# Patient Record
Sex: FEMALE | Race: WHITE | Employment: UNEMPLOYED | URBAN - NONMETROPOLITAN AREA
[De-identification: names, ages, dates, MRNs, and addresses within clinical notes are randomized per-mention and may not be internally consistent; named-entity substitution may affect disease eponyms.]

---

## 2018-01-08 ENCOUNTER — HOSPITAL ENCOUNTER (EMERGENCY)
Age: 38
Discharge: LEFT BEFORE DISCHARGE | End: 2018-01-09
Attending: FAMILY MEDICINE | Admitting: INTERNAL MEDICINE
Payer: MEDICARE

## 2018-01-08 ENCOUNTER — APPOINTMENT (OUTPATIENT)
Dept: INTERVENTIONAL RADIOLOGY/VASCULAR | Age: 38
End: 2018-01-08
Payer: MEDICARE

## 2018-01-08 ENCOUNTER — APPOINTMENT (OUTPATIENT)
Dept: GENERAL RADIOLOGY | Age: 38
End: 2018-01-08
Payer: MEDICARE

## 2018-01-08 ENCOUNTER — APPOINTMENT (OUTPATIENT)
Dept: CT IMAGING | Age: 38
End: 2018-01-08
Payer: MEDICARE

## 2018-01-08 VITALS
TEMPERATURE: 99.7 F | DIASTOLIC BLOOD PRESSURE: 84 MMHG | HEART RATE: 84 BPM | RESPIRATION RATE: 18 BRPM | SYSTOLIC BLOOD PRESSURE: 124 MMHG | OXYGEN SATURATION: 96 %

## 2018-01-08 DIAGNOSIS — J44.1 COPD WITH ACUTE EXACERBATION (HCC): Primary | ICD-10-CM

## 2018-01-08 DIAGNOSIS — R06.89 CO2 NARCOSIS: ICD-10-CM

## 2018-01-08 LAB
ALBUMIN SERPL-MCNC: 3.9 G/DL (ref 3.5–5.1)
ALLEN TEST: ABNORMAL
ALP BLD-CCNC: 106 U/L (ref 38–126)
ALT SERPL-CCNC: 22 U/L (ref 11–66)
AMPHETAMINE+METHAMPHETAMINE URINE SCREEN: NEGATIVE
ANION GAP SERPL CALCULATED.3IONS-SCNC: 9 MEQ/L (ref 8–16)
AST SERPL-CCNC: 20 U/L (ref 5–40)
BACTERIA: ABNORMAL /HPF
BARBITURATE QUANTITATIVE URINE: NEGATIVE
BASE EXCESS (CALCULATED): 5.1 MMOL/L (ref -2.5–2.5)
BASOPHILS # BLD: 0.2 %
BASOPHILS ABSOLUTE: 0 THOU/MM3 (ref 0–0.1)
BENZODIAZEPINE QUANTITATIVE URINE: NEGATIVE
BETA-HYDROXYBUTYRATE: 0.96 MG/DL (ref 0.2–2.81)
BILIRUB SERPL-MCNC: 0.3 MG/DL (ref 0.3–1.2)
BILIRUBIN DIRECT: < 0.2 MG/DL (ref 0–0.3)
BILIRUBIN URINE: NEGATIVE
BLOOD, URINE: ABNORMAL
BUN BLDV-MCNC: 8 MG/DL (ref 7–22)
CALCIUM SERPL-MCNC: 9.5 MG/DL (ref 8.5–10.5)
CANNABINOID QUANTITATIVE URINE: NEGATIVE
CASTS 2: ABNORMAL /LPF
CASTS UA: ABNORMAL /LPF
CHARACTER, URINE: ABNORMAL
CHLORIDE BLD-SCNC: 95 MEQ/L (ref 98–111)
CO2: 33 MEQ/L (ref 23–33)
COCAINE METABOLITE QUANTITATIVE URINE: NEGATIVE
COLLECTED BY:: ABNORMAL
COLOR: YELLOW
CREAT SERPL-MCNC: 0.5 MG/DL (ref 0.4–1.2)
CRYSTALS, UA: ABNORMAL
DEVICE: ABNORMAL
EKG ATRIAL RATE: 96 BPM
EKG P AXIS: 40 DEGREES
EKG P-R INTERVAL: 148 MS
EKG Q-T INTERVAL: 348 MS
EKG QRS DURATION: 100 MS
EKG QTC CALCULATION (BAZETT): 439 MS
EKG R AXIS: -13 DEGREES
EKG T AXIS: 16 DEGREES
EKG VENTRICULAR RATE: 96 BPM
EOSINOPHIL # BLD: 4 %
EOSINOPHILS ABSOLUTE: 0.4 THOU/MM3 (ref 0–0.4)
EPITHELIAL CELLS, UA: ABNORMAL /HPF
ETHYL ALCOHOL, SERUM: < 0.01 %
FLU A ANTIGEN: NEGATIVE
FLU B ANTIGEN: NEGATIVE
GFR SERPL CREATININE-BSD FRML MDRD: > 90 ML/MIN/1.73M2
GLUCOSE BLD-MCNC: 346 MG/DL (ref 70–108)
GLUCOSE URINE: >= 1000 MG/DL
HCO3: 32 MMOL/L (ref 23–28)
HCT VFR BLD CALC: 42.6 % (ref 37–47)
HEMOGLOBIN: 14.4 GM/DL (ref 12–16)
KETONES, URINE: NEGATIVE
LACTIC ACID: 1.8 MMOL/L (ref 0.5–2.2)
LEUKOCYTE ESTERASE, URINE: NEGATIVE
LIPASE: 11.1 U/L (ref 5.6–51.3)
LYMPHOCYTES # BLD: 34 %
LYMPHOCYTES ABSOLUTE: 3.1 THOU/MM3 (ref 1–4.8)
MAGNESIUM: 1.5 MG/DL (ref 1.6–2.4)
MCH RBC QN AUTO: 29.7 PG (ref 27–31)
MCHC RBC AUTO-ENTMCNC: 33.9 GM/DL (ref 33–37)
MCV RBC AUTO: 87.5 FL (ref 81–99)
MISCELLANEOUS 2: ABNORMAL
MONOCYTES # BLD: 5.7 %
MONOCYTES ABSOLUTE: 0.5 THOU/MM3 (ref 0.4–1.3)
NITRITE, URINE: NEGATIVE
NUCLEATED RED BLOOD CELLS: 0 /100 WBC
O2 SATURATION: 88 %
OPIATES, URINE: NEGATIVE
OSMOLALITY CALCULATION: 285.9 MOSMOL/KG (ref 275–300)
OXYCODONE: NEGATIVE
PCO2: 53 MMHG (ref 35–45)
PDW BLD-RTO: 12.9 % (ref 11.5–14.5)
PH BLOOD GAS: 7.39 (ref 7.35–7.45)
PH UA: 5.5
PHENCYCLIDINE QUANTITATIVE URINE: NEGATIVE
PLATELET # BLD: 244 THOU/MM3 (ref 130–400)
PMV BLD AUTO: 8.2 MCM (ref 7.4–10.4)
PO2: 56 MMHG (ref 71–104)
POTASSIUM SERPL-SCNC: 4.2 MEQ/L (ref 3.5–5.2)
PREGNANCY, SERUM: NEGATIVE
PROTEIN UA: NEGATIVE
RBC # BLD: 4.86 MILL/MM3 (ref 4.2–5.4)
RBC URINE: ABNORMAL /HPF
RENAL EPITHELIAL, UA: ABNORMAL
SEG NEUTROPHILS: 56.1 %
SEGMENTED NEUTROPHILS ABSOLUTE COUNT: 5.2 THOU/MM3 (ref 1.8–7.7)
SODIUM BLD-SCNC: 137 MEQ/L (ref 135–145)
SOURCE, BLOOD GAS: ABNORMAL
SPECIFIC GRAVITY, URINE: > 1.03 (ref 1–1.03)
TOTAL PROTEIN: 7.5 G/DL (ref 6.1–8)
TROPONIN T: < 0.01 NG/ML
UROBILINOGEN, URINE: 0.2 EU/DL
WBC # BLD: 9.2 THOU/MM3 (ref 4.8–10.8)
WBC UA: ABNORMAL /HPF
YEAST: ABNORMAL

## 2018-01-08 PROCEDURE — 84703 CHORIONIC GONADOTROPIN ASSAY: CPT

## 2018-01-08 PROCEDURE — 83880 ASSAY OF NATRIURETIC PEPTIDE: CPT

## 2018-01-08 PROCEDURE — 80053 COMPREHEN METABOLIC PANEL: CPT

## 2018-01-08 PROCEDURE — 6370000000 HC RX 637 (ALT 250 FOR IP): Performed by: FAMILY MEDICINE

## 2018-01-08 PROCEDURE — 83690 ASSAY OF LIPASE: CPT

## 2018-01-08 PROCEDURE — 2580000003 HC RX 258: Performed by: FAMILY MEDICINE

## 2018-01-08 PROCEDURE — 36600 WITHDRAWAL OF ARTERIAL BLOOD: CPT

## 2018-01-08 PROCEDURE — 36415 COLL VENOUS BLD VENIPUNCTURE: CPT

## 2018-01-08 PROCEDURE — 80307 DRUG TEST PRSMV CHEM ANLYZR: CPT

## 2018-01-08 PROCEDURE — 94640 AIRWAY INHALATION TREATMENT: CPT

## 2018-01-08 PROCEDURE — 83605 ASSAY OF LACTIC ACID: CPT

## 2018-01-08 PROCEDURE — 82010 KETONE BODYS QUAN: CPT

## 2018-01-08 PROCEDURE — 81001 URINALYSIS AUTO W/SCOPE: CPT

## 2018-01-08 PROCEDURE — 99285 EMERGENCY DEPT VISIT HI MDM: CPT

## 2018-01-08 PROCEDURE — 6360000004 HC RX CONTRAST MEDICATION: Performed by: FAMILY MEDICINE

## 2018-01-08 PROCEDURE — 71046 X-RAY EXAM CHEST 2 VIEWS: CPT

## 2018-01-08 PROCEDURE — 96375 TX/PRO/DX INJ NEW DRUG ADDON: CPT

## 2018-01-08 PROCEDURE — 87086 URINE CULTURE/COLONY COUNT: CPT

## 2018-01-08 PROCEDURE — 82248 BILIRUBIN DIRECT: CPT

## 2018-01-08 PROCEDURE — 83735 ASSAY OF MAGNESIUM: CPT

## 2018-01-08 PROCEDURE — 71275 CT ANGIOGRAPHY CHEST: CPT

## 2018-01-08 PROCEDURE — 84484 ASSAY OF TROPONIN QUANT: CPT

## 2018-01-08 PROCEDURE — 93005 ELECTROCARDIOGRAM TRACING: CPT

## 2018-01-08 PROCEDURE — G0480 DRUG TEST DEF 1-7 CLASSES: HCPCS

## 2018-01-08 PROCEDURE — 82803 BLOOD GASES ANY COMBINATION: CPT

## 2018-01-08 PROCEDURE — 93971 EXTREMITY STUDY: CPT

## 2018-01-08 PROCEDURE — 85025 COMPLETE CBC W/AUTO DIFF WBC: CPT

## 2018-01-08 PROCEDURE — 87804 INFLUENZA ASSAY W/OPTIC: CPT

## 2018-01-08 RX ORDER — IPRATROPIUM BROMIDE AND ALBUTEROL SULFATE 2.5; .5 MG/3ML; MG/3ML
1 SOLUTION RESPIRATORY (INHALATION) ONCE
Status: COMPLETED | OUTPATIENT
Start: 2018-01-08 | End: 2018-01-08

## 2018-01-08 RX ORDER — SODIUM CHLORIDE 9 MG/ML
INJECTION, SOLUTION INTRAVENOUS CONTINUOUS
Status: DISCONTINUED | OUTPATIENT
Start: 2018-01-08 | End: 2018-01-09 | Stop reason: HOSPADM

## 2018-01-08 RX ORDER — MAGNESIUM SULFATE IN WATER 40 MG/ML
2 INJECTION, SOLUTION INTRAVENOUS ONCE
Status: COMPLETED | OUTPATIENT
Start: 2018-01-08 | End: 2018-01-09

## 2018-01-08 RX ADMIN — IPRATROPIUM BROMIDE AND ALBUTEROL SULFATE 1 AMPULE: .5; 3 SOLUTION RESPIRATORY (INHALATION) at 22:06

## 2018-01-08 RX ADMIN — IOPAMIDOL 80 ML: 755 INJECTION, SOLUTION INTRAVENOUS at 23:21

## 2018-01-08 RX ADMIN — INSULIN HUMAN 10 UNITS: 100 INJECTION, SOLUTION PARENTERAL at 22:35

## 2018-01-08 RX ADMIN — SODIUM CHLORIDE: 9 INJECTION, SOLUTION INTRAVENOUS at 22:30

## 2018-01-08 RX ADMIN — IPRATROPIUM BROMIDE AND ALBUTEROL SULFATE 1 AMPULE: .5; 3 SOLUTION RESPIRATORY (INHALATION) at 22:01

## 2018-01-08 ASSESSMENT — ENCOUNTER SYMPTOMS
COUGH: 0
ABDOMINAL PAIN: 0
DIARRHEA: 0
NAUSEA: 0
APNEA: 0
TROUBLE SWALLOWING: 0
STRIDOR: 0
BACK PAIN: 0
VOMITING: 0
CHEST TIGHTNESS: 0
WHEEZING: 0
SORE THROAT: 0
SHORTNESS OF BREATH: 1
COLOR CHANGE: 0

## 2018-01-08 ASSESSMENT — PAIN DESCRIPTION - LOCATION: LOCATION: LEG

## 2018-01-08 ASSESSMENT — PAIN SCALES - GENERAL: PAINLEVEL_OUTOF10: 6

## 2018-01-08 ASSESSMENT — PAIN DESCRIPTION - PAIN TYPE: TYPE: ACUTE PAIN

## 2018-01-08 ASSESSMENT — PAIN DESCRIPTION - ORIENTATION: ORIENTATION: LEFT

## 2018-01-08 ASSESSMENT — PAIN DESCRIPTION - FREQUENCY: FREQUENCY: CONTINUOUS

## 2018-01-09 ENCOUNTER — HOSPITAL ENCOUNTER (EMERGENCY)
Age: 38
Discharge: LEFT BEFORE DISCHARGE | End: 2018-01-09
Payer: MEDICARE

## 2018-01-09 VITALS
WEIGHT: 260 LBS | SYSTOLIC BLOOD PRESSURE: 126 MMHG | DIASTOLIC BLOOD PRESSURE: 67 MMHG | RESPIRATION RATE: 22 BRPM | TEMPERATURE: 98.5 F | OXYGEN SATURATION: 94 % | HEART RATE: 99 BPM | HEIGHT: 66 IN | BODY MASS INDEX: 41.78 KG/M2

## 2018-01-09 DIAGNOSIS — E11.9 TYPE 2 DIABETES MELLITUS WITHOUT COMPLICATION, WITHOUT LONG-TERM CURRENT USE OF INSULIN (HCC): ICD-10-CM

## 2018-01-09 DIAGNOSIS — R09.02 HYPOXIA: ICD-10-CM

## 2018-01-09 DIAGNOSIS — J44.1 COPD EXACERBATION (HCC): Primary | ICD-10-CM

## 2018-01-09 LAB
ALBUMIN SERPL-MCNC: 3.7 G/DL (ref 3.5–5.1)
ALLEN TEST: POSITIVE
ALP BLD-CCNC: 108 U/L (ref 38–126)
ALT SERPL-CCNC: 21 U/L (ref 11–66)
ANION GAP SERPL CALCULATED.3IONS-SCNC: 10 MEQ/L (ref 8–16)
AST SERPL-CCNC: 20 U/L (ref 5–40)
BASE EXCESS (CALCULATED): 6.7 MMOL/L (ref -2.5–2.5)
BASOPHILS # BLD: 0.3 %
BASOPHILS ABSOLUTE: 0 THOU/MM3 (ref 0–0.1)
BILIRUB SERPL-MCNC: < 0.2 MG/DL (ref 0.3–1.2)
BUN BLDV-MCNC: 11 MG/DL (ref 7–22)
CALCIUM SERPL-MCNC: 9.4 MG/DL (ref 8.5–10.5)
CHLORIDE BLD-SCNC: 94 MEQ/L (ref 98–111)
CO2: 32 MEQ/L (ref 23–33)
COLLECTED BY:: ABNORMAL
CREAT SERPL-MCNC: 0.5 MG/DL (ref 0.4–1.2)
DEVICE: ABNORMAL
EKG ATRIAL RATE: 103 BPM
EKG P AXIS: 65 DEGREES
EKG P-R INTERVAL: 158 MS
EKG Q-T INTERVAL: 330 MS
EKG QRS DURATION: 94 MS
EKG QTC CALCULATION (BAZETT): 432 MS
EKG R AXIS: 7 DEGREES
EKG T AXIS: 28 DEGREES
EKG VENTRICULAR RATE: 103 BPM
EOSINOPHIL # BLD: 4 %
EOSINOPHILS ABSOLUTE: 0.3 THOU/MM3 (ref 0–0.4)
GFR SERPL CREATININE-BSD FRML MDRD: > 90 ML/MIN/1.73M2
GLUCOSE BLD-MCNC: 249 MG/DL (ref 70–108)
GLUCOSE BLD-MCNC: 424 MG/DL (ref 70–108)
HCO3: 35 MMOL/L (ref 23–28)
HCT VFR BLD CALC: 42.2 % (ref 37–47)
HEMOGLOBIN: 14.2 GM/DL (ref 12–16)
IFIO2: 1
LACTIC ACID: 2 MMOL/L (ref 0.5–2.2)
LYMPHOCYTES # BLD: 34.1 %
LYMPHOCYTES ABSOLUTE: 2.8 THOU/MM3 (ref 1–4.8)
MCH RBC QN AUTO: 29.6 PG (ref 27–31)
MCHC RBC AUTO-ENTMCNC: 33.7 GM/DL (ref 33–37)
MCV RBC AUTO: 87.6 FL (ref 81–99)
MONOCYTES # BLD: 6.2 %
MONOCYTES ABSOLUTE: 0.5 THOU/MM3 (ref 0.4–1.3)
NUCLEATED RED BLOOD CELLS: 0 /100 WBC
O2 SATURATION: 88 %
OSMOLALITY CALCULATION: 289.4 MOSMOL/KG (ref 275–300)
PCO2: 63 MMHG (ref 35–45)
PDW BLD-RTO: 12.9 % (ref 11.5–14.5)
PH BLOOD GAS: 7.35 (ref 7.35–7.45)
PLATELET # BLD: 213 THOU/MM3 (ref 130–400)
PMV BLD AUTO: 8.4 MCM (ref 7.4–10.4)
PO2: 59 MMHG (ref 71–104)
POTASSIUM SERPL-SCNC: 4.6 MEQ/L (ref 3.5–5.2)
PRO-BNP: 10.6 PG/ML (ref 0–450)
PRO-BNP: 6.5 PG/ML (ref 0–450)
RBC # BLD: 4.81 MILL/MM3 (ref 4.2–5.4)
SEG NEUTROPHILS: 55.4 %
SEGMENTED NEUTROPHILS ABSOLUTE COUNT: 4.5 THOU/MM3 (ref 1.8–7.7)
SODIUM BLD-SCNC: 136 MEQ/L (ref 135–145)
SOURCE, BLOOD GAS: ABNORMAL
TOTAL PROTEIN: 6.8 G/DL (ref 6.1–8)
WBC # BLD: 8.1 THOU/MM3 (ref 4.8–10.8)

## 2018-01-09 PROCEDURE — 82803 BLOOD GASES ANY COMBINATION: CPT

## 2018-01-09 PROCEDURE — 36415 COLL VENOUS BLD VENIPUNCTURE: CPT

## 2018-01-09 PROCEDURE — 83880 ASSAY OF NATRIURETIC PEPTIDE: CPT

## 2018-01-09 PROCEDURE — 6370000000 HC RX 637 (ALT 250 FOR IP): Performed by: NURSE PRACTITIONER

## 2018-01-09 PROCEDURE — 96365 THER/PROPH/DIAG IV INF INIT: CPT

## 2018-01-09 PROCEDURE — 80053 COMPREHEN METABOLIC PANEL: CPT

## 2018-01-09 PROCEDURE — 83605 ASSAY OF LACTIC ACID: CPT

## 2018-01-09 PROCEDURE — 85025 COMPLETE CBC W/AUTO DIFF WBC: CPT

## 2018-01-09 PROCEDURE — 82948 REAGENT STRIP/BLOOD GLUCOSE: CPT

## 2018-01-09 PROCEDURE — 94640 AIRWAY INHALATION TREATMENT: CPT

## 2018-01-09 PROCEDURE — 99285 EMERGENCY DEPT VISIT HI MDM: CPT

## 2018-01-09 PROCEDURE — 6360000002 HC RX W HCPCS: Performed by: FAMILY MEDICINE

## 2018-01-09 PROCEDURE — 93005 ELECTROCARDIOGRAM TRACING: CPT

## 2018-01-09 PROCEDURE — 36600 WITHDRAWAL OF ARTERIAL BLOOD: CPT

## 2018-01-09 RX ORDER — BUPRENORPHINE AND NALOXONE 4; 1 MG/1; MG/1
1 FILM, SOLUBLE BUCCAL; SUBLINGUAL 2 TIMES DAILY
Status: CANCELLED | OUTPATIENT
Start: 2018-01-09

## 2018-01-09 RX ORDER — TRAZODONE HYDROCHLORIDE 50 MG/1
50 TABLET ORAL NIGHTLY
Status: CANCELLED | OUTPATIENT
Start: 2018-01-09

## 2018-01-09 RX ORDER — QUETIAPINE FUMARATE 100 MG/1
100 TABLET, FILM COATED ORAL NIGHTLY
Status: CANCELLED | OUTPATIENT
Start: 2018-01-09

## 2018-01-09 RX ORDER — FUROSEMIDE 40 MG/1
40 TABLET ORAL DAILY
Status: CANCELLED | OUTPATIENT
Start: 2018-01-09

## 2018-01-09 RX ORDER — NAPROXEN 250 MG/1
500 TABLET ORAL 2 TIMES DAILY
Status: CANCELLED | OUTPATIENT
Start: 2018-01-09

## 2018-01-09 RX ORDER — PROMETHAZINE HYDROCHLORIDE 25 MG/1
25 TABLET ORAL EVERY 6 HOURS PRN
Status: CANCELLED | OUTPATIENT
Start: 2018-01-09

## 2018-01-09 RX ORDER — PREDNISONE 20 MG/1
40 TABLET ORAL ONCE
Status: COMPLETED | OUTPATIENT
Start: 2018-01-09 | End: 2018-01-09

## 2018-01-09 RX ORDER — IPRATROPIUM BROMIDE AND ALBUTEROL SULFATE 2.5; .5 MG/3ML; MG/3ML
1 SOLUTION RESPIRATORY (INHALATION) ONCE
Status: COMPLETED | OUTPATIENT
Start: 2018-01-09 | End: 2018-01-09

## 2018-01-09 RX ORDER — AZITHROMYCIN 250 MG/1
TABLET, FILM COATED ORAL
Qty: 1 PACKET | Refills: 0 | Status: SHIPPED | OUTPATIENT
Start: 2018-01-09 | End: 2018-01-19

## 2018-01-09 RX ORDER — ALBUTEROL SULFATE 90 UG/1
2 AEROSOL, METERED RESPIRATORY (INHALATION) EVERY 6 HOURS PRN
Qty: 1 INHALER | Refills: 3 | Status: SHIPPED | OUTPATIENT
Start: 2018-01-09

## 2018-01-09 RX ORDER — PREDNISONE 10 MG/1
TABLET ORAL
Qty: 20 TABLET | Refills: 0 | Status: SHIPPED | OUTPATIENT
Start: 2018-01-09 | End: 2018-01-19

## 2018-01-09 RX ADMIN — PREDNISONE 40 MG: 20 TABLET ORAL at 18:18

## 2018-01-09 RX ADMIN — IPRATROPIUM BROMIDE AND ALBUTEROL SULFATE 1 AMPULE: .5; 3 SOLUTION RESPIRATORY (INHALATION) at 17:56

## 2018-01-09 RX ADMIN — MAGNESIUM SULFATE HEPTAHYDRATE 2 G: 40 INJECTION, SOLUTION INTRAVENOUS at 00:32

## 2018-01-09 ASSESSMENT — ENCOUNTER SYMPTOMS
CONSTIPATION: 0
PHOTOPHOBIA: 0
EYE REDNESS: 0
SINUS PRESSURE: 0
COUGH: 0
CHEST TIGHTNESS: 0
SORE THROAT: 0
VOMITING: 0
DIARRHEA: 0
BLOOD IN STOOL: 0
SHORTNESS OF BREATH: 1
BACK PAIN: 0
ABDOMINAL PAIN: 0
ABDOMINAL DISTENTION: 0
VOICE CHANGE: 0
WHEEZING: 0
NAUSEA: 0
RHINORRHEA: 0
COLOR CHANGE: 0

## 2018-01-09 NOTE — ED PROVIDER NOTES
[facility]  eMERGENCY dEPARTMENT eNCOUnter          [address] [facility]       Chief Complaint   Patient presents with    Cough    Leg Swelling       Nurses Notes reviewed and I agree except as noted in the HPI. HISTORY OF PRESENT ILLNESS    Trice Ku is a 30 y.o. female who presents to the Emergency Department for the evaluation of Leg swelling and shortness of breath. Patient has noticed that for the past 3 weeks her left leg more so on the right leg. She also notices erythema and some pain. She is not having difficulty ambulating. Since the past 3 days she is having some shortness of breath beyond her baseline. She states she is having productive cough with yellowish sputum. She denies any fevers or chills. She denies any nausea or vomiting. She denies any headache but complains of occasional dizziness since shortness of breath started. She has a history of blood clots in her family. She states that she also had a blood clot during her pregnancy but she doesn't recall where it was. The HPI was provided by the patient. REVIEW OF SYSTEMS     Review of Systems   Constitutional: Negative for chills, diaphoresis, fatigue, fever and unexpected weight change. HENT: Negative for sore throat and trouble swallowing. Respiratory: Positive for shortness of breath. Negative for apnea, cough, chest tightness, wheezing and stridor. Cardiovascular: Positive for leg swelling. Negative for chest pain and palpitations. Gastrointestinal: Negative for abdominal pain, diarrhea, nausea and vomiting. Musculoskeletal: Negative for back pain and neck pain. Skin: Negative for color change. Allergic/Immunologic: Negative for immunocompromised state. Neurological: Negative for dizziness, syncope, speech difficulty, light-headedness, numbness and headaches. Hematological: Does not bruise/bleed easily. Psychiatric/Behavioral: Negative for confusion.    All other systems reviewed and are negative. PAST MEDICAL HISTORY    has a past medical history of Drug addiction ([address] and HPV (human papilloma virus) infection. SURGICAL HISTORY      has a past surgical history that includes Foot surgery (); Dilation and curettage of uterus (); Cervix biopsy (); Axillary Surgery (left , rt );  section (); and other surgical history (2012). CURRENT MEDICATIONS       Previous Medications    BUPRENORPHINE HCL-NALOXONE HCL (SUBOXONE) 4-1 MG FILM    Place 4 mg under the tongue 2 times daily. FUROSEMIDE (LASIX) 40 MG TABLET    Take 1 tablet by mouth daily. IBUPROFEN (ADVIL;MOTRIN) 800 MG TABLET    Take 800 mg by mouth every 6 hours as needed. LIDOCAINE VISCOUS (XYLOCAINE) 2 % SOLUTION    Take 10 mLs by mouth as needed for Pain (Saturate cotton ball in place on tooth and gum every 4-6 hours when necessary)    NAPROXEN (NAPROSYN) 500 MG TABLET    Take 1 tablet by mouth 2 times daily. NYSTATIN (MYCOSTATIN) POWDER    Apply topically 4 times daily. PROMETHAZINE (PHENERGAN) 25 MG TABLET    Take 25 mg by mouth every 6 hours as needed for Nausea. QUETIAPINE (SEROQUEL) 25 MG TABLET    Take 100 mg by mouth nightly. TRAZODONE (DESYREL) 50 MG TABLET    Take 50 mg by mouth nightly. ALLERGIES     is allergic to ketorolac tromethamine; morphine; and darvocet [propoxyphene n-acetaminophen]. FAMILY HISTORY     indicated that her mother is alive. She indicated that her father is . She indicated that the status of her maternal grandfather is unknown. She indicated that the status of her maternal aunt is unknown.    family history includes Arthritis in her mother; COPD in her father and mother; Cancer in her maternal grandfather; Diabetes in her maternal aunt; Heart Disease in her father; High Blood Pressure in her father and mother; Stroke in her father. SOCIAL HISTORY      reports that she has been smoking Cigarettes.   She has a 17.00

## 2018-01-09 NOTE — ED TRIAGE NOTES
Pt to er. Pt c/o SOB. States was seen here yesterday and dx PE. States left AMA. States SOB is worse today. Denies all pain. Resp regular. Pt placed on 2L NC due to low SPO2. Increased to 94%.

## 2018-01-09 NOTE — ED TRIAGE NOTES
Presents to ED with lower leg pain states that she is worried she has a blood clot resp easy and unlabored pms intact in left foot

## 2018-01-09 NOTE — ED PROVIDER NOTES
[facility] Emergency Department    CHIEF COMPLAINT       Chief Complaint   Patient presents with    Shortness of Breath       Nurses Notes reviewed and I agree except as noted in the HPI. HISTORY OF PRESENT ILLNESS    Trice Xiong is a 30 y.o. female who presents to the ED for evaluation of shortness of breath and leg swelling. The patient has a three week history of gradually worsening shortness of breath and leg swelling. The leg swelling is bilateral, however worse on the left compared to the right. She was evaluated in the ED last night for her symptoms, her chest CTA was inconclusive for a PE, however due to hypoxia present on her lab results, admission was offered to further workup and evaluation. The patient had left AMA last night, returns today as her symptoms have continued to worsen. She denies any recent travel and does not take any hormone therapy. The patient is a smoker. Family is concerned due to the patient having increased fatigue and falling asleep during daily activities. Symptoms description:  Onset: 3 weeks  Symptoms: SOB  Duration: SOB  Aggravating factors: exertion   Timing: worsening with time   Severity: moderate     HPI was provided by the patient. REVIEW OF SYSTEMS     Review of Systems   Constitutional: Negative for appetite change, chills, diaphoresis, fatigue, fever and unexpected weight change. HENT: Negative for congestion, hearing loss, postnasal drip, rhinorrhea, sinus pressure, sore throat and voice change. Eyes: Negative for photophobia, redness and visual disturbance. Respiratory: Positive for shortness of breath. Negative for cough, chest tightness and wheezing. Cardiovascular: Positive for leg swelling. Negative for chest pain and palpitations. Gastrointestinal: Negative for abdominal distention, abdominal pain, blood in stool, constipation, diarrhea, nausea and vomiting.    Endocrine: Negative for cold intolerance, heat intolerance, polydipsia, Neurological: She is alert and oriented to person, place, and time. Skin: Skin is warm, dry and intact. Psychiatric: She has a normal mood and affect. Her speech is normal and behavior is normal. Thought content normal.   Nursing note and vitals reviewed. DIFFERENTIAL DIAGNOSIS:   PE, Hypoxia, COPD exacerbation     DIAGNOSTIC RESULTS     EKG: All EKG's are interpreted by the Emergency Department Physician who either signs or Co-signs this chart in the absence of a cardiologist.    EKG read and interpreted by myself gives impression of sinus tachycardia with heart rate of 103; interval 158; QRS 94;QTc 432; axis 65/7/28. No STEMI      RADIOLOGY: non-plain film images(s) such as CT, Ultrasound and MRI are read by the radiologist.  Plain radiographic images are visualized and preliminarily interpreted by the emergency physician unless otherwise stated below.   No orders to display         LABS:   Labs Reviewed   COMPREHENSIVE METABOLIC PANEL - Abnormal; Notable for the following:        Result Value    Glucose 424 (*)     Chloride 94 (*)     Total Bilirubin <0.2 (*)     All other components within normal limits   BLOOD GAS, ARTERIAL - Abnormal; Notable for the following:     PCO2 63 (*)     PO2 59 (*)     HCO3 35 (*)     Base Excess (Calculated) 6.7 (*)     All other components within normal limits   BRAIN NATRIURETIC PEPTIDE   CBC WITH AUTO DIFFERENTIAL   LACTIC ACID, PLASMA   ANION GAP   GLOMERULAR FILTRATION RATE, ESTIMATED   OSMOLALITY   URINE DRUG SCREEN       EMERGENCY DEPARTMENT COURSE:   Vitals:    Vitals:    01/09/18 1704 01/09/18 1813 01/09/18 1955   BP: (!) 142/94 98/61 126/67   Pulse: 106 93 99   Resp: 18 20 22   Temp: 98.5 °F (36.9 °C)     TempSrc: Oral Oral    SpO2: 91% 96% 94%   Weight: 260 lb (117.9 kg)     Height: 5' 6\" (1.676 m)         MDM    Medications   predniSONE (DELTASONE) tablet 40 mg (40 mg Oral Given 1/9/18 1818)   ipratropium-albuterol (DUONEB) nebulizer solution 1 ampule (1 ampule

## 2018-01-10 LAB
ORGANISM: ABNORMAL
URINE CULTURE REFLEX: ABNORMAL

## 2018-01-13 ENCOUNTER — HOSPITAL ENCOUNTER (INPATIENT)
Age: 38
LOS: 1 days | Discharge: LEFT BEFORE DISCHARGE | DRG: 191 | End: 2018-01-15
Attending: FAMILY MEDICINE | Admitting: FAMILY MEDICINE
Payer: MEDICARE

## 2018-01-13 ENCOUNTER — APPOINTMENT (OUTPATIENT)
Dept: CT IMAGING | Age: 38
DRG: 191 | End: 2018-01-13
Payer: MEDICARE

## 2018-01-13 ENCOUNTER — APPOINTMENT (OUTPATIENT)
Dept: INTERVENTIONAL RADIOLOGY/VASCULAR | Age: 38
DRG: 191 | End: 2018-01-13
Payer: MEDICARE

## 2018-01-13 DIAGNOSIS — J44.1 COPD EXACERBATION (HCC): Primary | ICD-10-CM

## 2018-01-13 DIAGNOSIS — R09.02 HYPOXIA: ICD-10-CM

## 2018-01-13 DIAGNOSIS — R07.9 CHEST PAIN, UNSPECIFIED TYPE: ICD-10-CM

## 2018-01-13 LAB
ALLEN TEST: POSITIVE
ANION GAP SERPL CALCULATED.3IONS-SCNC: 12 MEQ/L (ref 8–16)
BASE EXCESS (CALCULATED): 2.6 MMOL/L (ref -2.5–2.5)
BASOPHILS # BLD: 0.5 %
BASOPHILS ABSOLUTE: 0 THOU/MM3 (ref 0–0.1)
BUN BLDV-MCNC: 7 MG/DL (ref 7–22)
CALCIUM SERPL-MCNC: 9.1 MG/DL (ref 8.5–10.5)
CHLORIDE BLD-SCNC: 95 MEQ/L (ref 98–111)
CO2: 29 MEQ/L (ref 23–33)
COLLECTED BY:: ABNORMAL
CREAT SERPL-MCNC: 0.6 MG/DL (ref 0.4–1.2)
DEVICE: ABNORMAL
EKG ATRIAL RATE: 102 BPM
EKG P AXIS: 56 DEGREES
EKG P-R INTERVAL: 164 MS
EKG Q-T INTERVAL: 338 MS
EKG QRS DURATION: 94 MS
EKG QTC CALCULATION (BAZETT): 440 MS
EKG R AXIS: 53 DEGREES
EKG T AXIS: 23 DEGREES
EKG VENTRICULAR RATE: 102 BPM
EOSINOPHIL # BLD: 3.6 %
EOSINOPHILS ABSOLUTE: 0.3 THOU/MM3 (ref 0–0.4)
FLU A ANTIGEN: NEGATIVE
FLU B ANTIGEN: NEGATIVE
GFR SERPL CREATININE-BSD FRML MDRD: > 90 ML/MIN/1.73M2
GLUCOSE BLD-MCNC: 404 MG/DL (ref 70–108)
HCO3: 29 MMOL/L (ref 23–28)
HCT VFR BLD CALC: 45.1 % (ref 37–47)
HEMOGLOBIN: 15.5 GM/DL (ref 12–16)
LACTIC ACID: 3 MMOL/L (ref 0.5–2.2)
LYMPHOCYTES # BLD: 29.8 %
LYMPHOCYTES ABSOLUTE: 2.9 THOU/MM3 (ref 1–4.8)
MAGNESIUM: 1.7 MG/DL (ref 1.6–2.4)
MCH RBC QN AUTO: 30.4 PG (ref 27–31)
MCHC RBC AUTO-ENTMCNC: 34.4 GM/DL (ref 33–37)
MCV RBC AUTO: 88.5 FL (ref 81–99)
MONOCYTES # BLD: 4.9 %
MONOCYTES ABSOLUTE: 0.5 THOU/MM3 (ref 0.4–1.3)
NUCLEATED RED BLOOD CELLS: 0 /100 WBC
O2 SATURATION: 93 %
OSMOLALITY CALCULATION: 286.9 MOSMOL/KG (ref 275–300)
PCO2: 52 MMHG (ref 35–45)
PDW BLD-RTO: 12.9 % (ref 11.5–14.5)
PH BLOOD GAS: 7.36 (ref 7.35–7.45)
PLATELET # BLD: 230 THOU/MM3 (ref 130–400)
PMV BLD AUTO: 8.5 MCM (ref 7.4–10.4)
PO2: 70 MMHG (ref 71–104)
POTASSIUM SERPL-SCNC: 4.1 MEQ/L (ref 3.5–5.2)
PRO-BNP: 9.6 PG/ML (ref 0–450)
PROCALCITONIN: 0.03 NG/ML (ref 0.01–0.09)
RBC # BLD: 5.09 MILL/MM3 (ref 4.2–5.4)
SEG NEUTROPHILS: 61.2 %
SEGMENTED NEUTROPHILS ABSOLUTE COUNT: 5.9 THOU/MM3 (ref 1.8–7.7)
SODIUM BLD-SCNC: 136 MEQ/L (ref 135–145)
SOURCE, BLOOD GAS: ABNORMAL
TROPONIN T: < 0.01 NG/ML
WBC # BLD: 9.7 THOU/MM3 (ref 4.8–10.8)

## 2018-01-13 PROCEDURE — 83880 ASSAY OF NATRIURETIC PEPTIDE: CPT

## 2018-01-13 PROCEDURE — 87147 CULTURE TYPE IMMUNOLOGIC: CPT

## 2018-01-13 PROCEDURE — 36415 COLL VENOUS BLD VENIPUNCTURE: CPT

## 2018-01-13 PROCEDURE — 84484 ASSAY OF TROPONIN QUANT: CPT

## 2018-01-13 PROCEDURE — 6360000002 HC RX W HCPCS: Performed by: FAMILY MEDICINE

## 2018-01-13 PROCEDURE — 87040 BLOOD CULTURE FOR BACTERIA: CPT

## 2018-01-13 PROCEDURE — 71275 CT ANGIOGRAPHY CHEST: CPT

## 2018-01-13 PROCEDURE — 96374 THER/PROPH/DIAG INJ IV PUSH: CPT

## 2018-01-13 PROCEDURE — 83605 ASSAY OF LACTIC ACID: CPT

## 2018-01-13 PROCEDURE — 83735 ASSAY OF MAGNESIUM: CPT

## 2018-01-13 PROCEDURE — 6370000000 HC RX 637 (ALT 250 FOR IP): Performed by: FAMILY MEDICINE

## 2018-01-13 PROCEDURE — 87804 INFLUENZA ASSAY W/OPTIC: CPT

## 2018-01-13 PROCEDURE — 80048 BASIC METABOLIC PNL TOTAL CA: CPT

## 2018-01-13 PROCEDURE — 93971 EXTREMITY STUDY: CPT

## 2018-01-13 PROCEDURE — 36600 WITHDRAWAL OF ARTERIAL BLOOD: CPT

## 2018-01-13 PROCEDURE — 85025 COMPLETE CBC W/AUTO DIFF WBC: CPT

## 2018-01-13 PROCEDURE — 99285 EMERGENCY DEPT VISIT HI MDM: CPT

## 2018-01-13 PROCEDURE — 82803 BLOOD GASES ANY COMBINATION: CPT

## 2018-01-13 PROCEDURE — 94640 AIRWAY INHALATION TREATMENT: CPT

## 2018-01-13 PROCEDURE — 93005 ELECTROCARDIOGRAM TRACING: CPT

## 2018-01-13 PROCEDURE — 84145 PROCALCITONIN (PCT): CPT

## 2018-01-13 PROCEDURE — 6360000004 HC RX CONTRAST MEDICATION: Performed by: FAMILY MEDICINE

## 2018-01-13 RX ORDER — IPRATROPIUM BROMIDE AND ALBUTEROL SULFATE 2.5; .5 MG/3ML; MG/3ML
1 SOLUTION RESPIRATORY (INHALATION) ONCE
Status: COMPLETED | OUTPATIENT
Start: 2018-01-13 | End: 2018-01-13

## 2018-01-13 RX ORDER — NICOTINE 21 MG/24HR
1 PATCH, TRANSDERMAL 24 HOURS TRANSDERMAL DAILY
Status: DISCONTINUED | OUTPATIENT
Start: 2018-01-14 | End: 2018-01-13

## 2018-01-13 RX ORDER — NICOTINE 21 MG/24HR
1 PATCH, TRANSDERMAL 24 HOURS TRANSDERMAL DAILY
Status: DISCONTINUED | OUTPATIENT
Start: 2018-01-13 | End: 2018-01-14 | Stop reason: HOSPADM

## 2018-01-13 RX ORDER — METHYLPREDNISOLONE SODIUM SUCCINATE 125 MG/2ML
125 INJECTION, POWDER, LYOPHILIZED, FOR SOLUTION INTRAMUSCULAR; INTRAVENOUS ONCE
Status: COMPLETED | OUTPATIENT
Start: 2018-01-13 | End: 2018-01-13

## 2018-01-13 RX ADMIN — IOPAMIDOL 80 ML: 755 INJECTION, SOLUTION INTRAVENOUS at 22:44

## 2018-01-13 RX ADMIN — IPRATROPIUM BROMIDE AND ALBUTEROL SULFATE 1 AMPULE: .5; 3 SOLUTION RESPIRATORY (INHALATION) at 21:58

## 2018-01-13 RX ADMIN — METHYLPREDNISOLONE SODIUM SUCCINATE 125 MG: 125 INJECTION, POWDER, FOR SOLUTION INTRAMUSCULAR; INTRAVENOUS at 21:44

## 2018-01-13 ASSESSMENT — ENCOUNTER SYMPTOMS
SORE THROAT: 0
SHORTNESS OF BREATH: 1
EYE PAIN: 0
VOMITING: 0
BACK PAIN: 0
NAUSEA: 0
RHINORRHEA: 0
ABDOMINAL PAIN: 0
EYE DISCHARGE: 0
DIARRHEA: 0
WHEEZING: 0
COUGH: 1

## 2018-01-13 ASSESSMENT — PAIN SCALES - GENERAL
PAINLEVEL_OUTOF10: 4
PAINLEVEL_OUTOF10: 5
PAINLEVEL_OUTOF10: 2
PAINLEVEL_OUTOF10: 5

## 2018-01-13 ASSESSMENT — PAIN DESCRIPTION - PAIN TYPE: TYPE: ACUTE PAIN

## 2018-01-13 ASSESSMENT — PAIN DESCRIPTION - LOCATION: LOCATION: CHEST

## 2018-01-14 VITALS
RESPIRATION RATE: 18 BRPM | HEART RATE: 94 BPM | SYSTOLIC BLOOD PRESSURE: 117 MMHG | DIASTOLIC BLOOD PRESSURE: 62 MMHG | TEMPERATURE: 98.2 F | OXYGEN SATURATION: 95 % | BODY MASS INDEX: 41.24 KG/M2 | HEIGHT: 66 IN | WEIGHT: 256.6 LBS

## 2018-01-14 PROBLEM — E87.20 LACTIC ACIDOSIS: Status: ACTIVE | Noted: 2018-01-14

## 2018-01-14 PROBLEM — R06.02 SHORTNESS OF BREATH: Status: ACTIVE | Noted: 2018-01-14

## 2018-01-14 PROBLEM — E66.9 DIABETES MELLITUS TYPE 2 IN OBESE (HCC): Status: ACTIVE | Noted: 2018-01-14

## 2018-01-14 PROBLEM — R07.9 CHEST PAIN: Status: ACTIVE | Noted: 2018-01-14

## 2018-01-14 PROBLEM — E11.69 DIABETES MELLITUS TYPE 2 IN OBESE (HCC): Status: ACTIVE | Noted: 2018-01-14

## 2018-01-14 PROBLEM — R09.02 HYPOXIA: Status: ACTIVE | Noted: 2018-01-14

## 2018-01-14 PROBLEM — Z72.0 TOBACCO ABUSE: Status: ACTIVE | Noted: 2018-01-14

## 2018-01-14 LAB
AMPHETAMINE+METHAMPHETAMINE URINE SCREEN: NEGATIVE
BACTERIA: ABNORMAL /HPF
BARBITURATE QUANTITATIVE URINE: NEGATIVE
BENZODIAZEPINE QUANTITATIVE URINE: NEGATIVE
BILIRUBIN URINE: NEGATIVE
BLOOD, URINE: NEGATIVE
CANNABINOID QUANTITATIVE URINE: NEGATIVE
CASTS UA: ABNORMAL /LPF
CHARACTER, URINE: CLEAR
COCAINE METABOLITE QUANTITATIVE URINE: NEGATIVE
COLOR: YELLOW
CRYSTALS, UA: ABNORMAL
EPITHELIAL CELLS, UA: ABNORMAL /HPF
GLUCOSE BLD-MCNC: 412 MG/DL (ref 70–108)
GLUCOSE BLD-MCNC: 458 MG/DL (ref 70–108)
GLUCOSE BLD-MCNC: 459 MG/DL (ref 70–108)
GLUCOSE BLD-MCNC: 488 MG/DL (ref 70–108)
GLUCOSE URINE: >= 1000 MG/DL
KETONES, URINE: ABNORMAL
LACTIC ACID: 1.3 MMOL/L (ref 0.5–2.2)
LEUKOCYTE ESTERASE, URINE: NEGATIVE
NITRITE, URINE: NEGATIVE
OPIATES, URINE: NEGATIVE
OXYCODONE: NEGATIVE
PH UA: 6
PHENCYCLIDINE QUANTITATIVE URINE: NEGATIVE
PROTEIN UA: NEGATIVE
RBC URINE: ABNORMAL /HPF
RENAL EPITHELIAL, UA: ABNORMAL
SPECIFIC GRAVITY, URINE: > 1.03 (ref 1–1.03)
TROPONIN T: < 0.01 NG/ML
TROPONIN T: < 0.01 NG/ML
UROBILINOGEN, URINE: 0.2 EU/DL
WBC UA: ABNORMAL /HPF
YEAST: ABNORMAL

## 2018-01-14 PROCEDURE — 96375 TX/PRO/DX INJ NEW DRUG ADDON: CPT

## 2018-01-14 PROCEDURE — 2580000003 HC RX 258: Performed by: FAMILY MEDICINE

## 2018-01-14 PROCEDURE — 36415 COLL VENOUS BLD VENIPUNCTURE: CPT

## 2018-01-14 PROCEDURE — 6370000000 HC RX 637 (ALT 250 FOR IP): Performed by: FAMILY MEDICINE

## 2018-01-14 PROCEDURE — 81001 URINALYSIS AUTO W/SCOPE: CPT

## 2018-01-14 PROCEDURE — 84484 ASSAY OF TROPONIN QUANT: CPT

## 2018-01-14 PROCEDURE — 6370000000 HC RX 637 (ALT 250 FOR IP): Performed by: INTERNAL MEDICINE

## 2018-01-14 PROCEDURE — 83605 ASSAY OF LACTIC ACID: CPT

## 2018-01-14 PROCEDURE — 6360000002 HC RX W HCPCS: Performed by: FAMILY MEDICINE

## 2018-01-14 PROCEDURE — 99232 SBSQ HOSP IP/OBS MODERATE 35: CPT | Performed by: INTERNAL MEDICINE

## 2018-01-14 PROCEDURE — 1200000003 HC TELEMETRY R&B

## 2018-01-14 PROCEDURE — 2580000003 HC RX 258: Performed by: INTERNAL MEDICINE

## 2018-01-14 PROCEDURE — 82948 REAGENT STRIP/BLOOD GLUCOSE: CPT

## 2018-01-14 PROCEDURE — 6360000002 HC RX W HCPCS: Performed by: INTERNAL MEDICINE

## 2018-01-14 PROCEDURE — 99223 1ST HOSP IP/OBS HIGH 75: CPT | Performed by: FAMILY MEDICINE

## 2018-01-14 PROCEDURE — 80307 DRUG TEST PRSMV CHEM ANLYZR: CPT

## 2018-01-14 RX ORDER — VENLAFAXINE 37.5 MG/1
37.5 TABLET ORAL DAILY
COMMUNITY
End: 2019-05-15 | Stop reason: DRUGHIGH

## 2018-01-14 RX ORDER — DEXTROSE AND SODIUM CHLORIDE 5; .9 G/100ML; G/100ML
100 INJECTION, SOLUTION INTRAVENOUS PRN
Status: DISCONTINUED | OUTPATIENT
Start: 2018-01-14 | End: 2018-01-15 | Stop reason: HOSPADM

## 2018-01-14 RX ORDER — ASPIRIN 81 MG/1
324 TABLET, CHEWABLE ORAL ONCE
Status: COMPLETED | OUTPATIENT
Start: 2018-01-14 | End: 2018-01-14

## 2018-01-14 RX ORDER — QUETIAPINE 200 MG/1
200 TABLET, FILM COATED, EXTENDED RELEASE ORAL NIGHTLY
Status: DISCONTINUED | OUTPATIENT
Start: 2018-01-14 | End: 2018-01-15 | Stop reason: HOSPADM

## 2018-01-14 RX ORDER — IPRATROPIUM BROMIDE AND ALBUTEROL SULFATE 2.5; .5 MG/3ML; MG/3ML
1 SOLUTION RESPIRATORY (INHALATION) EVERY 4 HOURS PRN
Status: DISCONTINUED | OUTPATIENT
Start: 2018-01-14 | End: 2018-01-15 | Stop reason: HOSPADM

## 2018-01-14 RX ORDER — GABAPENTIN 600 MG/1
600 TABLET ORAL NIGHTLY
COMMUNITY
End: 2019-04-24

## 2018-01-14 RX ORDER — METHYLPREDNISOLONE SODIUM SUCCINATE 40 MG/ML
40 INJECTION, POWDER, LYOPHILIZED, FOR SOLUTION INTRAMUSCULAR; INTRAVENOUS EVERY 6 HOURS
Status: DISCONTINUED | OUTPATIENT
Start: 2018-01-14 | End: 2018-01-14

## 2018-01-14 RX ORDER — FUROSEMIDE 40 MG/1
40 TABLET ORAL DAILY
Status: DISCONTINUED | OUTPATIENT
Start: 2018-01-14 | End: 2018-01-14

## 2018-01-14 RX ORDER — QUETIAPINE FUMARATE 100 MG/1
100 TABLET, FILM COATED ORAL DAILY
Status: DISCONTINUED | OUTPATIENT
Start: 2018-01-14 | End: 2018-01-15 | Stop reason: HOSPADM

## 2018-01-14 RX ORDER — SODIUM CHLORIDE 0.9 % (FLUSH) 0.9 %
10 SYRINGE (ML) INJECTION EVERY 12 HOURS SCHEDULED
Status: DISCONTINUED | OUTPATIENT
Start: 2018-01-14 | End: 2018-01-15 | Stop reason: HOSPADM

## 2018-01-14 RX ORDER — SODIUM CHLORIDE 0.9 % (FLUSH) 0.9 %
10 SYRINGE (ML) INJECTION PRN
Status: DISCONTINUED | OUTPATIENT
Start: 2018-01-14 | End: 2018-01-15 | Stop reason: HOSPADM

## 2018-01-14 RX ORDER — BUPRENORPHINE AND NALOXONE 8; 2 MG/1; MG/1
1 FILM, SOLUBLE BUCCAL; SUBLINGUAL 2 TIMES DAILY
Status: DISCONTINUED | OUTPATIENT
Start: 2018-01-14 | End: 2018-01-15 | Stop reason: HOSPADM

## 2018-01-14 RX ORDER — QUETIAPINE FUMARATE 100 MG/1
100 TABLET, FILM COATED ORAL NIGHTLY
Status: DISCONTINUED | OUTPATIENT
Start: 2018-01-14 | End: 2018-01-14

## 2018-01-14 RX ORDER — SODIUM CHLORIDE 9 MG/ML
INJECTION, SOLUTION INTRAVENOUS CONTINUOUS
Status: DISCONTINUED | OUTPATIENT
Start: 2018-01-14 | End: 2018-01-15 | Stop reason: HOSPADM

## 2018-01-14 RX ORDER — GABAPENTIN 600 MG/1
600 TABLET ORAL NIGHTLY
Status: DISCONTINUED | OUTPATIENT
Start: 2018-01-14 | End: 2018-01-15 | Stop reason: HOSPADM

## 2018-01-14 RX ORDER — ALBUTEROL SULFATE 90 UG/1
2 AEROSOL, METERED RESPIRATORY (INHALATION) EVERY 6 HOURS PRN
Status: DISCONTINUED | OUTPATIENT
Start: 2018-01-14 | End: 2018-01-15 | Stop reason: HOSPADM

## 2018-01-14 RX ORDER — VENLAFAXINE 37.5 MG/1
37.5 TABLET ORAL DAILY
Status: DISCONTINUED | OUTPATIENT
Start: 2018-01-14 | End: 2018-01-15 | Stop reason: HOSPADM

## 2018-01-14 RX ORDER — QUETIAPINE 200 MG/1
200 TABLET, FILM COATED, EXTENDED RELEASE ORAL NIGHTLY
COMMUNITY
End: 2019-05-15 | Stop reason: SDUPTHER

## 2018-01-14 RX ORDER — 0.9 % SODIUM CHLORIDE 0.9 %
500 INTRAVENOUS SOLUTION INTRAVENOUS ONCE
Status: COMPLETED | OUTPATIENT
Start: 2018-01-14 | End: 2018-01-14

## 2018-01-14 RX ORDER — NICOTINE POLACRILEX 4 MG
15 LOZENGE BUCCAL PRN
Status: DISCONTINUED | OUTPATIENT
Start: 2018-01-14 | End: 2018-01-15 | Stop reason: HOSPADM

## 2018-01-14 RX ORDER — IBUPROFEN 800 MG/1
800 TABLET ORAL EVERY 8 HOURS PRN
Status: DISCONTINUED | OUTPATIENT
Start: 2018-01-14 | End: 2018-01-15 | Stop reason: HOSPADM

## 2018-01-14 RX ORDER — TRAZODONE HYDROCHLORIDE 50 MG/1
50 TABLET ORAL NIGHTLY
Status: DISCONTINUED | OUTPATIENT
Start: 2018-01-14 | End: 2018-01-14 | Stop reason: ALTCHOICE

## 2018-01-14 RX ORDER — GABAPENTIN 300 MG/1
300 CAPSULE ORAL 2 TIMES DAILY
Status: DISCONTINUED | OUTPATIENT
Start: 2018-01-14 | End: 2018-01-15 | Stop reason: HOSPADM

## 2018-01-14 RX ORDER — GABAPENTIN 300 MG/1
300 CAPSULE ORAL 2 TIMES DAILY
COMMUNITY
End: 2019-04-24

## 2018-01-14 RX ORDER — INSULIN GLARGINE 100 [IU]/ML
20 INJECTION, SOLUTION SUBCUTANEOUS 2 TIMES DAILY
Status: DISCONTINUED | OUTPATIENT
Start: 2018-01-14 | End: 2018-01-15 | Stop reason: HOSPADM

## 2018-01-14 RX ORDER — NICOTINE 21 MG/24HR
1 PATCH, TRANSDERMAL 24 HOURS TRANSDERMAL DAILY
Status: DISCONTINUED | OUTPATIENT
Start: 2018-01-14 | End: 2018-01-14

## 2018-01-14 RX ORDER — NICOTINE 21 MG/24HR
1 PATCH, TRANSDERMAL 24 HOURS TRANSDERMAL DAILY
Status: DISCONTINUED | OUTPATIENT
Start: 2018-01-15 | End: 2018-01-15 | Stop reason: HOSPADM

## 2018-01-14 RX ORDER — ONDANSETRON 2 MG/ML
4 INJECTION INTRAMUSCULAR; INTRAVENOUS EVERY 6 HOURS PRN
Status: DISCONTINUED | OUTPATIENT
Start: 2018-01-14 | End: 2018-01-15 | Stop reason: HOSPADM

## 2018-01-14 RX ORDER — BUPRENORPHINE AND NALOXONE 4; 1 MG/1; MG/1
1 FILM, SOLUBLE BUCCAL; SUBLINGUAL 2 TIMES DAILY
Status: DISCONTINUED | OUTPATIENT
Start: 2018-01-14 | End: 2018-01-14

## 2018-01-14 RX ORDER — DEXTROSE MONOHYDRATE 25 G/50ML
12.5 INJECTION, SOLUTION INTRAVENOUS PRN
Status: DISCONTINUED | OUTPATIENT
Start: 2018-01-14 | End: 2018-01-15 | Stop reason: HOSPADM

## 2018-01-14 RX ORDER — ACETAMINOPHEN 325 MG/1
650 TABLET ORAL EVERY 4 HOURS PRN
Status: DISCONTINUED | OUTPATIENT
Start: 2018-01-14 | End: 2018-01-15 | Stop reason: HOSPADM

## 2018-01-14 RX ADMIN — INSULIN HUMAN 5 UNITS: 100 INJECTION, SOLUTION PARENTERAL at 01:57

## 2018-01-14 RX ADMIN — SODIUM CHLORIDE 500 ML: 9 INJECTION, SOLUTION INTRAVENOUS at 04:17

## 2018-01-14 RX ADMIN — GABAPENTIN 300 MG: 300 CAPSULE ORAL at 09:47

## 2018-01-14 RX ADMIN — SODIUM CHLORIDE: 9 INJECTION, SOLUTION INTRAVENOUS at 09:47

## 2018-01-14 RX ADMIN — SODIUM CHLORIDE: 9 INJECTION, SOLUTION INTRAVENOUS at 21:00

## 2018-01-14 RX ADMIN — INSULIN LISPRO 18 UNITS: 100 INJECTION, SOLUTION INTRAVENOUS; SUBCUTANEOUS at 16:33

## 2018-01-14 RX ADMIN — ASPIRIN 81 MG 324 MG: 81 TABLET ORAL at 02:56

## 2018-01-14 RX ADMIN — QUETIAPINE FUMARATE 100 MG: 100 TABLET ORAL at 09:47

## 2018-01-14 RX ADMIN — QUETIAPINE FUMARATE 200 MG: 200 TABLET, EXTENDED RELEASE ORAL at 21:16

## 2018-01-14 RX ADMIN — BUPRENORPHINE HYDROCHLORIDE, NALOXONE HYDROCHLORIDE 1 FILM: 8; 2 FILM, SOLUBLE BUCCAL; SUBLINGUAL at 09:48

## 2018-01-14 RX ADMIN — VENLAFAXINE 37.5 MG: 37.5 TABLET ORAL at 09:47

## 2018-01-14 RX ADMIN — GABAPENTIN 300 MG: 300 CAPSULE ORAL at 16:00

## 2018-01-14 RX ADMIN — Medication 12 UNITS: at 09:48

## 2018-01-14 RX ADMIN — NICOTINE 1 PUFF: 4 INHALANT RESPIRATORY (INHALATION) at 22:46

## 2018-01-14 RX ADMIN — INSULIN GLARGINE 20 UNITS: 100 INJECTION, SOLUTION SUBCUTANEOUS at 21:21

## 2018-01-14 RX ADMIN — INSULIN GLARGINE 20 UNITS: 100 INJECTION, SOLUTION SUBCUTANEOUS at 10:08

## 2018-01-14 RX ADMIN — INSULIN LISPRO 9 UNITS: 100 INJECTION, SOLUTION INTRAVENOUS; SUBCUTANEOUS at 21:15

## 2018-01-14 RX ADMIN — METHYLPREDNISOLONE SODIUM SUCCINATE 40 MG: 40 INJECTION, POWDER, FOR SOLUTION INTRAMUSCULAR; INTRAVENOUS at 09:47

## 2018-01-14 RX ADMIN — ENOXAPARIN SODIUM 40 MG: 40 INJECTION SUBCUTANEOUS at 16:33

## 2018-01-14 RX ADMIN — GABAPENTIN 600 MG: 600 TABLET ORAL at 21:16

## 2018-01-14 RX ADMIN — FUROSEMIDE 40 MG: 40 TABLET ORAL at 09:47

## 2018-01-14 RX ADMIN — BUPRENORPHINE HYDROCHLORIDE, NALOXONE HYDROCHLORIDE 1 FILM: 8; 2 FILM, SOLUBLE BUCCAL; SUBLINGUAL at 21:16

## 2018-01-14 RX ADMIN — INSULIN LISPRO 18 UNITS: 100 INJECTION, SOLUTION INTRAVENOUS; SUBCUTANEOUS at 12:26

## 2018-01-14 ASSESSMENT — PAIN SCALES - GENERAL
PAINLEVEL_OUTOF10: 0
PAINLEVEL_OUTOF10: 3
PAINLEVEL_OUTOF10: 0
PAINLEVEL_OUTOF10: 0
PAINLEVEL_OUTOF10: 3

## 2018-01-14 NOTE — ED PROVIDER NOTES
normal mood and affect. Her behavior is normal. Thought content normal.   Nursing note and vitals reviewed. DIFFERENTIAL DIAGNOSIS:   Including but not limited to: DVT, CHF, PE.    DIAGNOSTIC RESULTS     EKG: All EKG's are interpreted by the Emergency Department Physician who either signs or Co-signs this chart in the absence of a cardiologist.  EKG interpreted by Salima Parker MD:    Sinus tachycardia, incomplete right bundle branch block, septal infarct, abnormal EKG, ventricular rate 102 bpm    RADIOLOGY: non-plain film images(s) such as CT, Ultrasound and MRI are read by the radiologist.    CTA CHEST W WO CONTRAST   Final Result      No definite acute pulmonary embolism. Pulmonary artery enhancement is better than the prior study but again suboptimal.   No pneumonia. Improving bilateral lower lobe, lingular and right middle lobe atelectasis. **This report has been created using voice recognition software. It may contain minor errors which are inherent in voice recognition technology. **      Final report electronically signed by Dr. Amy Cisneros on 1/14/2018 12:01 AM      VL DUP LOWER EXTREMITY VENOUS LEFT    (Results Pending)       LABS:   Labs Reviewed   BASIC METABOLIC PANEL - Abnormal; Notable for the following:        Result Value    Chloride 95 (*)     Glucose 404 (*)     All other components within normal limits   BLOOD GAS, ARTERIAL - Abnormal; Notable for the following:     PCO2 52 (*)     PO2 70 (*)     HCO3 29 (*)     Base Excess (Calculated) 2.6 (*)     All other components within normal limits   LACTIC ACID, PLASMA - Abnormal; Notable for the following:     Lactic Acid 3.0 (*)     All other components within normal limits   RAPID INFLUENZA A/B ANTIGENS   CULTURE BLOOD #1   CULTURE BLOOD #2   CBC WITH AUTO DIFFERENTIAL   TROPONIN   BRAIN NATRIURETIC PEPTIDE   MAGNESIUM   PROCALCITONIN   ANION GAP   GLOMERULAR FILTRATION RATE, ESTIMATED   OSMOLALITY       EMERGENCY DEPARTMENT

## 2018-01-14 NOTE — ED NOTES
Pt back from CT. VSS at this time. No distress noted. Updated on POC. Call light within reach.      Salty Royal RN  01/13/18 [id number]

## 2018-01-14 NOTE — ED NOTES
Pt resting in bed. VSS at this time. Pt updated on POC. Family at bedside. Call light within reach.      Samreen Riley RN  01/14/18 [id number]

## 2018-01-14 NOTE — ED NOTES
Pt resting in bed. VSS. Medicated per order. Will continue to monitor.      Tatum Barajas RN  01/13/18 1219

## 2018-01-14 NOTE — PROGRESS NOTES
on floor to speak to patient about no smoking permitted in the hospital. Patient stated that her 15year old daughter was smoking in the bathroom, not her. Daughter no longer on the floor. Dr. Robyn Baptiste updated.
Patient ambulated with her 15year old daughter to the reed bathroom on the [address] pod on 4K. Cigarette smoke was noted per the nurses on that pod. When patient came out of bathroom, smoke noted coming out of the bathroom by the tech. Sinus tachycardia 120s/min. When confronted about no smoking aloud in the unit, patient stated that we could not prove it that she was smoking. [place] police notified.
electronically signed by Dr. Tracy Penaloza on 1/14/2018 12:01 AM          Diet: DIET CARB CONTROL;    DVT prophylaxis: [x] Lovenox                                 [] SCDs                                 [] SQ Heparin                                 [] Encourage ambulation           [] Already on Anticoagulation     Disposition:    [x] Home       [] TCU       [] Rehab       [] Psych       [] SNF       [] [place]       [] Other-    Code Status: Full Code    PT/OT Eval Status:   Assessment/Plan:    Anticipated Discharge in : 2-3 days    Active Hospital Problems    Diagnosis Date Noted    COPD exacerbation ([facility] [address] [J44.1] 01/09/2018     Priority: High     Class: Acute    Tobacco abuse [Z72.0] 01/14/2018    Diabetes mellitus type 2 in obese ([facility] [address] [E11.69, E66.9] 01/14/2018    Lactic acidosis [E87.2] 01/14/2018    Shortness of breath [R06.02] 01/14/2018    Chest pain [R07.9] 01/14/2018    Hypoxia [R09.02] 01/14/2018       COPD exacerbation: Continue breathing treatments, O2, hold steroids  DM: Glucose control, Hg A1C.  IVF, hold lasix  Tobacco use disorder: ,nicotine patch  Continue all other mgt    Second visit        Electronically signed by Mann Darnell MD on 1/14/2018 at 1:32 PM

## 2018-01-14 NOTE — PLAN OF CARE
Problem: Cardiovascular  Goal: No DVT, peripheral vascular complications  Outcome: Ongoing  BIlat swelling in lower extremities noted, left 3+ Right 2+, Homans sign negative, doppler negative  Goal: Hemodynamic stability  Outcome: Met This Shift      Problem: Respiratory  Goal: No pulmonary complications  Outcome: Ongoing  Lungs with DBS in bases. Dyspneic on exertion. Goal: O2 Sat > 90%  Outcome: Met This Shift    Goal: Supplemental O2 requirements decreased  Outcome: Ongoing  O2 at 2L/NC and pulse oximeter 92%    Problem: Skin Integrity/Risk  Goal: No skin breakdown during hospitalization  Outcome: Ongoing  No new skin breakdown. Turns self. Problem: Musculor/Skeletal Functional Status  Goal: Absence of falls  Outcome: Ongoing  On fall precautions Up with standby assist.    Problem: Discharge Planning  Goal: Discharged to appropriate level of care  Discharged to appropriate level of care     Outcome: Ongoing  Plans to return home at discharge. On IV solumedrol. Monitor chems. Chem 488 this AM.    Comments: Care plan reviewed with patient and family. Patient and family verbalize understanding of the plan of care and contribute to goal setting.

## 2018-01-14 NOTE — H&P
file prior to encounter. Current Outpatient Prescriptions on File Prior to Encounter   Medication Sig Dispense Refill    predniSONE (DELTASONE) 10 MG tablet Take 4 tablets by mouth once daily for 5 days 20 tablet 0    azithromycin (ZITHROMAX) 250 MG tablet Take 2 tablets (500 mg) on Day 1, followed by 1 tablet (250 mg) once daily on Days 2 through 5. 1 packet 0    albuterol sulfate HFA (PROVENTIL HFA) 108 (90 Base) MCG/ACT inhaler Inhale 2 puffs into the lungs every 6 hours as needed for Wheezing 1 Inhaler 3    lidocaine viscous (XYLOCAINE) 2 % solution Take 10 mLs by mouth as needed for Pain (Saturate cotton ball in place on tooth and gum every 4-6 hours when necessary) 100 mL 0    naproxen (NAPROSYN) 500 MG tablet Take 1 tablet by mouth 2 times daily. 20 tablet 0    nystatin (MYCOSTATIN) powder Apply topically 4 times daily. 1 Bottle 0    promethazine (PHENERGAN) 25 MG tablet Take 25 mg by mouth every 6 hours as needed for Nausea.  furosemide (LASIX) 40 MG tablet Take 1 tablet by mouth daily. 30 tablet 5    Buprenorphine HCl-Naloxone HCl (SUBOXONE) 4-1 MG FILM Place 4 mg under the tongue 2 times daily.  QUEtiapine (SEROQUEL) 25 MG tablet Take 100 mg by mouth nightly.  traZODone (DESYREL) 50 MG tablet Take 50 mg by mouth nightly.  ibuprofen (ADVIL;MOTRIN) 800 MG tablet Take 800 mg by mouth every 6 hours as needed. Allergies:  Ketorolac tromethamine; Morphine; and Darvocet [propoxyphene n-acetaminophen]    Social History:    reports that she has been smoking Cigarettes. She has a 17.00 pack-year smoking history. She has never used smokeless tobacco. She reports that she uses drugs. She reports that she does not drink alcohol.     Family History:       Problem Relation Age of Onset    COPD Mother     High Blood Pressure Mother     Arthritis Mother     COPD Father     High Blood Pressure Father     Heart Disease Father     Stroke Father     Diabetes Maternal Aunt    

## 2018-01-16 LAB
BLOOD CULTURE, ROUTINE: ABNORMAL
BLOOD CULTURE, ROUTINE: ABNORMAL
ORGANISM: ABNORMAL

## 2018-01-19 LAB — BLOOD CULTURE, ROUTINE: NORMAL

## 2018-01-26 ENCOUNTER — TELEPHONE (OUTPATIENT)
Dept: CARDIOLOGY CLINIC | Age: 38
End: 2018-01-26

## 2018-04-12 ENCOUNTER — TELEPHONE (OUTPATIENT)
Dept: PULMONOLOGY | Age: 38
End: 2018-04-12

## 2018-07-03 ENCOUNTER — HOSPITAL ENCOUNTER (EMERGENCY)
Age: 38
Discharge: HOME | End: 2018-07-03
Payer: MEDICARE

## 2018-07-03 ENCOUNTER — APPOINTMENT (OUTPATIENT)
Dept: GENERAL RADIOLOGY | Age: 38
End: 2018-07-03
Payer: MEDICARE

## 2018-07-03 VITALS
DIASTOLIC BLOOD PRESSURE: 95 MMHG | BODY MASS INDEX: 36.48 KG/M2 | RESPIRATION RATE: 16 BRPM | OXYGEN SATURATION: 100 % | HEART RATE: 96 BPM | WEIGHT: 226 LBS | TEMPERATURE: 99 F | SYSTOLIC BLOOD PRESSURE: 141 MMHG

## 2018-07-03 DIAGNOSIS — E11.65 UNCONTROLLED TYPE 2 DIABETES MELLITUS WITH HYPERGLYCEMIA, WITH LONG-TERM CURRENT USE OF INSULIN (HCC): ICD-10-CM

## 2018-07-03 DIAGNOSIS — R11.2 NON-INTRACTABLE VOMITING WITH NAUSEA, UNSPECIFIED VOMITING TYPE: ICD-10-CM

## 2018-07-03 DIAGNOSIS — R20.2 PARESTHESIA: Primary | ICD-10-CM

## 2018-07-03 DIAGNOSIS — Z79.4 UNCONTROLLED TYPE 2 DIABETES MELLITUS WITH HYPERGLYCEMIA, WITH LONG-TERM CURRENT USE OF INSULIN (HCC): ICD-10-CM

## 2018-07-03 LAB
ALBUMIN SERPL-MCNC: 3.9 G/DL (ref 3.5–5.1)
ALP BLD-CCNC: 110 U/L (ref 38–126)
ALT SERPL-CCNC: 14 U/L (ref 11–66)
ANION GAP SERPL CALCULATED.3IONS-SCNC: 17 MEQ/L (ref 8–16)
AST SERPL-CCNC: 18 U/L (ref 5–40)
BASOPHILS # BLD: 0.7 %
BASOPHILS ABSOLUTE: 0.1 THOU/MM3 (ref 0–0.1)
BILIRUB SERPL-MCNC: 0.4 MG/DL (ref 0.3–1.2)
BUN BLDV-MCNC: 11 MG/DL (ref 7–22)
CALCIUM SERPL-MCNC: 9.8 MG/DL (ref 8.5–10.5)
CHLORIDE BLD-SCNC: 99 MEQ/L (ref 98–111)
CO2: 26 MEQ/L (ref 23–33)
CREAT SERPL-MCNC: 0.5 MG/DL (ref 0.4–1.2)
EKG ATRIAL RATE: 86 BPM
EKG P AXIS: 63 DEGREES
EKG P-R INTERVAL: 156 MS
EKG Q-T INTERVAL: 370 MS
EKG QRS DURATION: 96 MS
EKG QTC CALCULATION (BAZETT): 442 MS
EKG R AXIS: -26 DEGREES
EKG T AXIS: 24 DEGREES
EKG VENTRICULAR RATE: 86 BPM
EOSINOPHIL # BLD: 1.3 %
EOSINOPHILS ABSOLUTE: 0.2 THOU/MM3 (ref 0–0.4)
ERYTHROCYTE [DISTWIDTH] IN BLOOD BY AUTOMATED COUNT: 12.6 % (ref 11.5–14.5)
ERYTHROCYTE [DISTWIDTH] IN BLOOD BY AUTOMATED COUNT: 40 FL (ref 35–45)
GFR SERPL CREATININE-BSD FRML MDRD: > 90 ML/MIN/1.73M2
GLUCOSE BLD-MCNC: 240 MG/DL (ref 70–108)
HCT VFR BLD CALC: 47.7 % (ref 37–47)
HEMOGLOBIN: 16 GM/DL (ref 12–16)
IMMATURE GRANS (ABS): 0.05 THOU/MM3 (ref 0–0.07)
IMMATURE GRANULOCYTES: 0.3 %
LYMPHOCYTES # BLD: 23.9 %
LYMPHOCYTES ABSOLUTE: 3.5 THOU/MM3 (ref 1–4.8)
MCH RBC QN AUTO: 29.3 PG (ref 26–33)
MCHC RBC AUTO-ENTMCNC: 33.5 GM/DL (ref 32.2–35.5)
MCV RBC AUTO: 87.2 FL (ref 81–99)
MONOCYTES # BLD: 3.7 %
MONOCYTES ABSOLUTE: 0.5 THOU/MM3 (ref 0.4–1.3)
NUCLEATED RED BLOOD CELLS: 0 /100 WBC
OSMOLALITY CALCULATION: 290.4 MOSMOL/KG (ref 275–300)
PLATELET # BLD: 328 THOU/MM3 (ref 130–400)
PMV BLD AUTO: 9.9 FL (ref 9.4–12.4)
POTASSIUM SERPL-SCNC: 4.6 MEQ/L (ref 3.5–5.2)
RBC # BLD: 5.47 MILL/MM3 (ref 4.2–5.4)
SEG NEUTROPHILS: 70.1 %
SEGMENTED NEUTROPHILS ABSOLUTE COUNT: 10.2 THOU/MM3 (ref 1.8–7.7)
SODIUM BLD-SCNC: 142 MEQ/L (ref 135–145)
TOTAL PROTEIN: 7.7 G/DL (ref 6.1–8)
TROPONIN T: < 0.01 NG/ML
WBC # BLD: 14.5 THOU/MM3 (ref 4.8–10.8)

## 2018-07-03 PROCEDURE — 84484 ASSAY OF TROPONIN QUANT: CPT

## 2018-07-03 PROCEDURE — 6360000002 HC RX W HCPCS: Performed by: PHYSICIAN ASSISTANT

## 2018-07-03 PROCEDURE — 99284 EMERGENCY DEPT VISIT MOD MDM: CPT

## 2018-07-03 PROCEDURE — 71046 X-RAY EXAM CHEST 2 VIEWS: CPT

## 2018-07-03 PROCEDURE — 36415 COLL VENOUS BLD VENIPUNCTURE: CPT

## 2018-07-03 PROCEDURE — 93005 ELECTROCARDIOGRAM TRACING: CPT | Performed by: PHYSICIAN ASSISTANT

## 2018-07-03 PROCEDURE — 85025 COMPLETE CBC W/AUTO DIFF WBC: CPT

## 2018-07-03 PROCEDURE — 80053 COMPREHEN METABOLIC PANEL: CPT

## 2018-07-03 RX ORDER — ONDANSETRON 4 MG/1
4 TABLET, ORALLY DISINTEGRATING ORAL EVERY 8 HOURS PRN
Qty: 10 TABLET | Refills: 0 | Status: SHIPPED | OUTPATIENT
Start: 2018-07-03 | End: 2019-05-08

## 2018-07-03 RX ORDER — ONDANSETRON 4 MG/1
4 TABLET, ORALLY DISINTEGRATING ORAL ONCE
Status: COMPLETED | OUTPATIENT
Start: 2018-07-03 | End: 2018-07-03

## 2018-07-03 RX ADMIN — ONDANSETRON 4 MG: 4 TABLET, ORALLY DISINTEGRATING ORAL at 13:15

## 2018-07-03 ASSESSMENT — HEART SCORE: ECG: 0

## 2018-07-03 ASSESSMENT — ENCOUNTER SYMPTOMS
VOMITING: 1
DIARRHEA: 0
SHORTNESS OF BREATH: 1
SORE THROAT: 0
EYE PAIN: 0
ABDOMINAL PAIN: 0
COUGH: 0
EYE DISCHARGE: 0
BACK PAIN: 0
NAUSEA: 1
WHEEZING: 0
RHINORRHEA: 0

## 2018-07-03 ASSESSMENT — PAIN SCALES - GENERAL: PAINLEVEL_OUTOF10: 6

## 2018-07-03 ASSESSMENT — PAIN DESCRIPTION - LOCATION: LOCATION: HEAD

## 2018-07-03 ASSESSMENT — PAIN DESCRIPTION - PAIN TYPE: TYPE: ACUTE PAIN

## 2018-07-03 NOTE — ED PROVIDER NOTES
membranes are normal. No oropharyngeal exudate. Eyes: Conjunctivae, EOM and lids are normal. Pupils are equal, round, and reactive to light. No scleral icterus. Neck: Normal range of motion. Neck supple. No neck rigidity. No tracheal deviation present. Cardiovascular: Normal rate, regular rhythm and normal heart sounds. No murmur heard. Pulmonary/Chest: Effort normal and breath sounds normal. No stridor. No respiratory distress. She has no decreased breath sounds. She has no wheezes. Abdominal: Soft. She exhibits no distension. There is no tenderness. There is no rigidity. Musculoskeletal: Normal range of motion. She exhibits no edema. Left shoulder: Normal.        Left elbow: Normal.        Left wrist: She exhibits normal range of motion, no tenderness and no swelling. Cervical back: Normal.        Left upper arm: Normal.        Left forearm: Normal.   Patient had mild decreased sensation to her left wrist but has 5/5 strength in all her extremities. No signs of DVT   Lymphadenopathy:     She has no cervical adenopathy. Neurological: She is alert and oriented to person, place, and time. She has normal strength. Gait normal.   No gross deficits observed   Skin: Skin is warm, dry and intact. No rash noted. She is not diaphoretic. No pallor. Psychiatric: She has a normal mood and affect. Her speech is normal and behavior is normal. Thought content normal.   Nursing note and vitals reviewed. Heart Score for chest pain patients  History: Slightly Suspicious  ECG: Normal  Patient Age: < 45 years  *Risk factors for Atherosclerotic disease: Cigarette smoking, Diabetes Mellitus, Obesity  Risk Factors: > 3 Risk factors or history of atherosclerotic disease*  Troponin: < 1X normal limit  Heart Score Total: 2      DIFFERENTIAL DIAGNOSIS:   Including but not limited to: neuropathy, muscle strain, Chest pain equivalent, cervical radiculopathy, and sprain. DIAGNOSTIC RESULTS     EKG:  All

## 2018-07-04 PROCEDURE — 93010 ELECTROCARDIOGRAM REPORT: CPT | Performed by: INTERNAL MEDICINE

## 2018-07-06 ENCOUNTER — HOSPITAL ENCOUNTER (EMERGENCY)
Age: 38
Discharge: HOME | End: 2018-07-07
Attending: EMERGENCY MEDICINE
Payer: MEDICARE

## 2018-07-06 ENCOUNTER — APPOINTMENT (OUTPATIENT)
Dept: ULTRASOUND IMAGING | Age: 38
End: 2018-07-06
Payer: MEDICARE

## 2018-07-06 DIAGNOSIS — D72.829 LEUKOCYTOSIS, UNSPECIFIED TYPE: ICD-10-CM

## 2018-07-06 DIAGNOSIS — R11.2 NAUSEA AND VOMITING, INTRACTABILITY OF VOMITING NOT SPECIFIED, UNSPECIFIED VOMITING TYPE: Primary | ICD-10-CM

## 2018-07-06 DIAGNOSIS — R03.0 ELEVATED BLOOD PRESSURE READING: ICD-10-CM

## 2018-07-06 LAB
BASOPHILS # BLD: 0.5 %
BASOPHILS ABSOLUTE: 0.1 THOU/MM3 (ref 0–0.1)
EOSINOPHIL # BLD: 0.3 %
EOSINOPHILS ABSOLUTE: 0 THOU/MM3 (ref 0–0.4)
ERYTHROCYTE [DISTWIDTH] IN BLOOD BY AUTOMATED COUNT: 12.5 % (ref 11.5–14.5)
ERYTHROCYTE [DISTWIDTH] IN BLOOD BY AUTOMATED COUNT: 37.9 FL (ref 35–45)
GLUCOSE BLD-MCNC: 190 MG/DL (ref 70–108)
HCT VFR BLD CALC: 43.3 % (ref 37–47)
HEMOGLOBIN: 15.2 GM/DL (ref 12–16)
IMMATURE GRANS (ABS): 0.05 THOU/MM3 (ref 0–0.07)
IMMATURE GRANULOCYTES: 0.3 %
LYMPHOCYTES # BLD: 27 %
LYMPHOCYTES ABSOLUTE: 4.2 THOU/MM3 (ref 1–4.8)
MCH RBC QN AUTO: 29.1 PG (ref 26–33)
MCHC RBC AUTO-ENTMCNC: 35.1 GM/DL (ref 32.2–35.5)
MCV RBC AUTO: 83 FL (ref 81–99)
MONOCYTES # BLD: 6.2 %
MONOCYTES ABSOLUTE: 1 THOU/MM3 (ref 0.4–1.3)
NUCLEATED RED BLOOD CELLS: 0 /100 WBC
PLATELET # BLD: 357 THOU/MM3 (ref 130–400)
PMV BLD AUTO: 9 FL (ref 9.4–12.4)
RBC # BLD: 5.22 MILL/MM3 (ref 4.2–5.4)
SEG NEUTROPHILS: 65.7 %
SEGMENTED NEUTROPHILS ABSOLUTE COUNT: 10.2 THOU/MM3 (ref 1.8–7.7)
WBC # BLD: 15.5 THOU/MM3 (ref 4.8–10.8)

## 2018-07-06 PROCEDURE — 83690 ASSAY OF LIPASE: CPT

## 2018-07-06 PROCEDURE — 76705 ECHO EXAM OF ABDOMEN: CPT

## 2018-07-06 PROCEDURE — 6360000002 HC RX W HCPCS: Performed by: EMERGENCY MEDICINE

## 2018-07-06 PROCEDURE — 84703 CHORIONIC GONADOTROPIN ASSAY: CPT

## 2018-07-06 PROCEDURE — 82948 REAGENT STRIP/BLOOD GLUCOSE: CPT

## 2018-07-06 PROCEDURE — 80053 COMPREHEN METABOLIC PANEL: CPT

## 2018-07-06 PROCEDURE — 36415 COLL VENOUS BLD VENIPUNCTURE: CPT

## 2018-07-06 PROCEDURE — 99284 EMERGENCY DEPT VISIT MOD MDM: CPT

## 2018-07-06 PROCEDURE — 85025 COMPLETE CBC W/AUTO DIFF WBC: CPT

## 2018-07-06 RX ORDER — INSULIN GLARGINE 100 [IU]/ML
60 INJECTION, SOLUTION SUBCUTANEOUS 2 TIMES DAILY
COMMUNITY
End: 2019-04-24

## 2018-07-06 RX ORDER — ONDANSETRON 4 MG/1
4 TABLET, ORALLY DISINTEGRATING ORAL ONCE
Status: COMPLETED | OUTPATIENT
Start: 2018-07-06 | End: 2018-07-06

## 2018-07-06 RX ADMIN — ONDANSETRON 4 MG: 4 TABLET, ORALLY DISINTEGRATING ORAL at 22:05

## 2018-07-06 ASSESSMENT — PAIN SCALES - GENERAL: PAINLEVEL_OUTOF10: 4

## 2018-07-06 ASSESSMENT — ENCOUNTER SYMPTOMS
RHINORRHEA: 0
ABDOMINAL PAIN: 0
EYE DISCHARGE: 0
WHEEZING: 0
VOMITING: 1
NAUSEA: 1
SORE THROAT: 0
BACK PAIN: 0
SHORTNESS OF BREATH: 0
EYE PAIN: 0
COUGH: 0
DIARRHEA: 0

## 2018-07-06 ASSESSMENT — PAIN DESCRIPTION - ORIENTATION: ORIENTATION: RIGHT;UPPER

## 2018-07-06 ASSESSMENT — PAIN DESCRIPTION - DESCRIPTORS: DESCRIPTORS: SHARP

## 2018-07-06 ASSESSMENT — PAIN DESCRIPTION - LOCATION: LOCATION: ABDOMEN

## 2018-07-06 ASSESSMENT — PAIN DESCRIPTION - PAIN TYPE: TYPE: ACUTE PAIN

## 2018-07-06 ASSESSMENT — PAIN DESCRIPTION - FREQUENCY: FREQUENCY: CONTINUOUS

## 2018-07-07 VITALS
WEIGHT: 226 LBS | BODY MASS INDEX: 36.32 KG/M2 | RESPIRATION RATE: 20 BRPM | TEMPERATURE: 98.8 F | HEART RATE: 62 BPM | SYSTOLIC BLOOD PRESSURE: 143 MMHG | OXYGEN SATURATION: 100 % | DIASTOLIC BLOOD PRESSURE: 94 MMHG | HEIGHT: 66 IN

## 2018-07-07 LAB
ALBUMIN SERPL-MCNC: 3.9 G/DL (ref 3.5–5.1)
ALP BLD-CCNC: 96 U/L (ref 38–126)
ALT SERPL-CCNC: 14 U/L (ref 11–66)
ANION GAP SERPL CALCULATED.3IONS-SCNC: 14 MEQ/L (ref 8–16)
AST SERPL-CCNC: 14 U/L (ref 5–40)
BILIRUB SERPL-MCNC: 0.4 MG/DL (ref 0.3–1.2)
BUN BLDV-MCNC: 15 MG/DL (ref 7–22)
CALCIUM SERPL-MCNC: 9.6 MG/DL (ref 8.5–10.5)
CHLORIDE BLD-SCNC: 102 MEQ/L (ref 98–111)
CO2: 22 MEQ/L (ref 23–33)
CREAT SERPL-MCNC: 0.5 MG/DL (ref 0.4–1.2)
GFR SERPL CREATININE-BSD FRML MDRD: > 90 ML/MIN/1.73M2
GLUCOSE BLD-MCNC: 186 MG/DL (ref 70–108)
LIPASE: 11.8 U/L (ref 5.6–51.3)
OSMOLALITY CALCULATION: 281.4 MOSMOL/KG (ref 275–300)
POTASSIUM SERPL-SCNC: 4 MEQ/L (ref 3.5–5.2)
PREGNANCY, SERUM: NEGATIVE
SODIUM BLD-SCNC: 138 MEQ/L (ref 135–145)
TOTAL PROTEIN: 7.6 G/DL (ref 6.1–8)

## 2018-07-07 PROCEDURE — 96372 THER/PROPH/DIAG INJ SC/IM: CPT

## 2018-07-07 PROCEDURE — 6360000002 HC RX W HCPCS: Performed by: EMERGENCY MEDICINE

## 2018-07-07 RX ORDER — PROMETHAZINE HYDROCHLORIDE 25 MG/1
25 TABLET ORAL EVERY 6 HOURS PRN
Qty: 20 TABLET | Refills: 0 | Status: SHIPPED | OUTPATIENT
Start: 2018-07-07 | End: 2018-07-14

## 2018-07-07 RX ORDER — PROMETHAZINE HYDROCHLORIDE 25 MG/ML
25 INJECTION, SOLUTION INTRAMUSCULAR; INTRAVENOUS ONCE
Status: COMPLETED | OUTPATIENT
Start: 2018-07-07 | End: 2018-07-07

## 2018-07-07 RX ADMIN — PROMETHAZINE HYDROCHLORIDE 25 MG: 25 INJECTION INTRAMUSCULAR; INTRAVENOUS at 00:12

## 2018-07-07 NOTE — ED PROVIDER NOTES
[facility]  eMERGENCY dEPARTMENT eNCOUnter          [address] [facility]       Chief Complaint   Patient presents with    Abdominal Pain     RUQ    Nausea    Emesis       Nurses Notes reviewed and I agree except as noted in the HPI. HISTORY OF PRESENT ILLNESS    Trice Woodward is a 30 y.o. female who presents to Emergency Department with nausea and vomiting for the past 3 days. The pt also c/o hematuria. She was seen by her PCP today and prescribed Bactrim for a UTI. She is also worried about possible gallstones. No abdominal pain, fever, chills, or diarrhea. No further complaints at the time of the initial encounter. REVIEW OF SYSTEMS     Review of Systems   Constitutional: Negative for appetite change, chills, fatigue and fever. HENT: Negative for congestion, ear pain, rhinorrhea and sore throat. Eyes: Negative for pain, discharge and visual disturbance. Respiratory: Negative for cough, shortness of breath and wheezing. Cardiovascular: Negative for chest pain, palpitations and leg swelling. Gastrointestinal: Positive for nausea and vomiting. Negative for abdominal pain and diarrhea. Genitourinary: Negative for difficulty urinating, dysuria and vaginal discharge. Musculoskeletal: Negative for arthralgias, back pain, joint swelling and neck pain. Skin: Negative for pallor and rash. Neurological: Negative for dizziness, syncope, weakness, light-headedness and headaches. Hematological: Negative for adenopathy. Psychiatric/Behavioral: Negative for confusion, dysphoric mood and suicidal ideas. The patient is not nervous/anxious. PAST MEDICAL HISTORY    has a past medical history of COPD (chronic obstructive pulmonary disease) ([facility] [address] Diabetes mellitus ([facility] [address] Drug addiction ([facility] [address] and HPV (human papilloma virus) infection. SURGICAL HISTORY      has a past surgical history that includes Foot surgery (2005); Dilation and curettage of uterus (2006);  Cervix biopsy (); Axillary Surgery (left , rt );  section (); and other surgical history (2012). CURRENT MEDICATIONS       Previous Medications    ALBUTEROL SULFATE HFA (PROVENTIL HFA) 108 (90 BASE) MCG/ACT INHALER    Inhale 2 puffs into the lungs every 6 hours as needed for Wheezing    BUPRENORPHINE-NALOXONE (SUBOXONE) 8-2 MG FILM SL FILM    Place 1 Film under the tongue 2 times daily . FUROSEMIDE (LASIX) 40 MG TABLET    Take 1 tablet by mouth daily. GABAPENTIN (NEURONTIN) 300 MG CAPSULE    Take 300 mg by mouth 2 times daily. GABAPENTIN (NEURONTIN) 600 MG TABLET    Take 600 mg by mouth nightly. IBUPROFEN (ADVIL;MOTRIN) 800 MG TABLET    Take 800 mg by mouth every 6 hours as needed. INSULIN GLARGINE (LANTUS) 100 UNIT/ML INJECTION VIAL    Inject 40 Units into the skin 2 times daily    INSULIN LISPRO (HUMALOG) 100 UNIT/ML INJECTION VIAL    Inject into the skin 3 times daily (before meals) sliding scale    ONDANSETRON (ZOFRAN ODT) 4 MG DISINTEGRATING TABLET    Take 1 tablet by mouth every 8 hours as needed for Nausea    QUETIAPINE (SEROQUEL XR) 200 MG EXTENDED RELEASE TABLET    Take 200 mg by mouth nightly    QUETIAPINE (SEROQUEL) 25 MG TABLET    Take 100 mg by mouth daily     VENLAFAXINE (EFFEXOR) 37.5 MG TABLET    Take 37.5 mg by mouth daily       ALLERGIES     is allergic to ketorolac tromethamine; morphine; and darvocet [propoxyphene n-acetaminophen]. FAMILY HISTORY     indicated that her mother is alive. She indicated that her father is . She indicated that the status of her maternal grandfather is unknown. She indicated that the status of her maternal aunt is unknown.    family history includes Arthritis in her mother; COPD in her father and mother; Cancer in her maternal grandfather; Diabetes in her maternal aunt; Heart Disease in her father; High Blood Pressure in her father and mother; Stroke in her father.     SOCIAL HISTORY      reports that she has been Immature Grans (Abs) 0.05 0.00 - 0.07 thou/mm3    nRBC 0 /100 wbc   Comprehensive Metabolic Panel   Result Value Ref Range    Glucose 186 (H) 70 - 108 mg/dL    CREATININE 0.5 0.4 - 1.2 mg/dL    BUN 15 7 - 22 mg/dL    Sodium 138 135 - 145 meq/L    Potassium 4.0 3.5 - 5.2 meq/L    Chloride 102 98 - 111 meq/L    CO2 22 (L) 23 - 33 meq/L    Calcium 9.6 8.5 - 10.5 mg/dL    AST 14 5 - 40 U/L    Alkaline Phosphatase 96 38 - 126 U/L    Total Protein 7.6 6.1 - 8.0 g/dL    Alb 3.9 3.5 - 5.1 g/dL    Total Bilirubin 0.4 0.3 - 1.2 mg/dL    ALT 14 11 - 66 U/L   Lipase   Result Value Ref Range    Lipase 11.8 5.6 - 51.3 U/L   HCG Qualitative, Serum   Result Value Ref Range    Preg, Serum NEGATIVE NEGATIVE   Anion Gap   Result Value Ref Range    Anion Gap 14.0 8.0 - 16.0 meq/L   Glomerular Filtration Rate, Estimated   Result Value Ref Range    Est, Glom Filt Rate >90 ml/min/1.73m2   Osmolality   Result Value Ref Range    Osmolality Calc 281.4 275.0 - 300 mOsmol/kg   POCT Glucose   Result Value Ref Range    POC Glucose 190 (H) 70 - 108 mg/dl       EMERGENCY DEPARTMENT COURSE:   Vitals:    Vitals:    07/06/18 2153 07/07/18 0014   BP: (!) 152/81 (!) 143/94   Pulse: 68 62   Resp: 20 20   Temp: 98.4 °F (36.9 °C) 98.8 °F (37.1 °C)   TempSrc: Oral Oral   SpO2: 98% 100%   Weight: 226 lb (102.5 kg)    Height: 5' 6\" (1.676 m)        9:51 PM      Patient is seen and evaluated in a timely fashion. Medical Decision Making    Labs reviewed. WBC 15.5. LFTs and lipase are normal.    Gallbladder ultrasound has no acute findings. No gallstone cholecystitis. Nausea vomiting while under control with IM Phenergan. She has a complete benign abdominal exam on reevaluation, off note she has no tenderness, no guarding or rebound all lower abdomen. UA is not tested given that she is already on Bactrim for UTI. She is discharged with Phenergan prescribed. Continue Bactrim. Follow-up by primary care physician in 3 days.      Blood pressure

## 2018-07-07 NOTE — ED NOTES
Bed: 011A  Expected date: 7/6/18  Expected time:   [facility]  Comments:      Kartik Salvador RN  07/06/18 [id number]

## 2018-07-07 NOTE — ED TRIAGE NOTES
Pt states that having RUQ pain. Pt takes Suboxone for drug addiction, gabapentin for pain and insulin for diabetes. Pt has not been able to take meds d/t not feeling well and not keeping down. Pt voices no other c/o or needs. C/o feeling nauseated. Dr Park Vargas in room to evaluate pt. Will check glucose.

## 2018-12-21 ENCOUNTER — HOSPITAL ENCOUNTER (OUTPATIENT)
Age: 38
Setting detail: SPECIMEN
Discharge: HOME | End: 2018-12-21
Payer: MEDICARE

## 2018-12-21 LAB
DIRECT EXAM: ABNORMAL
DIRECT EXAM: NORMAL
DIRECT EXAM: NORMAL
Lab: ABNORMAL
Lab: NORMAL
SPECIMEN DESCRIPTION: ABNORMAL
SPECIMEN DESCRIPTION: NORMAL
STATUS: ABNORMAL
STATUS: NORMAL

## 2018-12-24 LAB
C TRACH DNA GENITAL QL NAA+PROBE: NEGATIVE
N. GONORRHOEAE DNA: NEGATIVE

## 2019-02-01 ENCOUNTER — HOSPITAL ENCOUNTER (OUTPATIENT)
Age: 39
Setting detail: SPECIMEN
Discharge: HOME | End: 2019-02-01
Payer: MEDICARE

## 2019-02-01 LAB
-: ABNORMAL
AMORPHOUS: ABNORMAL
BACTERIA: ABNORMAL
BILIRUBIN URINE: ABNORMAL
CASTS UA: ABNORMAL /LPF (ref 0–8)
COLOR: ABNORMAL
COMMENT UA: ABNORMAL
CRYSTALS, UA: ABNORMAL /HPF
EPITHELIAL CELLS UA: ABNORMAL /HPF (ref 0–5)
GLUCOSE URINE: ABNORMAL
KETONES, URINE: ABNORMAL
LEUKOCYTE ESTERASE, URINE: NEGATIVE
MUCUS: ABNORMAL
NITRITE, URINE: NEGATIVE
OTHER OBSERVATIONS UA: ABNORMAL
PH UA: 5.5 (ref 5–8)
PROTEIN UA: ABNORMAL
RBC UA: ABNORMAL /HPF (ref 0–4)
RENAL EPITHELIAL, UA: ABNORMAL /HPF
SPECIFIC GRAVITY UA: 1.03 (ref 1–1.03)
TRICHOMONAS: ABNORMAL
TURBIDITY: ABNORMAL
URINE HGB: ABNORMAL
UROBILINOGEN, URINE: NORMAL
WBC UA: ABNORMAL /HPF (ref 0–5)
YEAST: ABNORMAL

## 2019-02-02 LAB
CULTURE: NORMAL
Lab: NORMAL
SPECIMEN DESCRIPTION: NORMAL
STATUS: NORMAL

## 2019-04-01 ENCOUNTER — HOSPITAL ENCOUNTER (OUTPATIENT)
Age: 39
Setting detail: SPECIMEN
Discharge: HOME | End: 2019-04-01
Payer: MEDICARE

## 2019-04-01 LAB
DIRECT EXAM: ABNORMAL
Lab: ABNORMAL
SPECIMEN DESCRIPTION: ABNORMAL

## 2019-04-24 ENCOUNTER — OFFICE VISIT (OUTPATIENT)
Dept: INTERNAL MEDICINE CLINIC | Age: 39
End: 2019-04-24
Payer: MEDICARE

## 2019-04-24 VITALS
DIASTOLIC BLOOD PRESSURE: 99 MMHG | HEART RATE: 92 BPM | HEIGHT: 66 IN | SYSTOLIC BLOOD PRESSURE: 151 MMHG | BODY MASS INDEX: 34.87 KG/M2 | WEIGHT: 217 LBS

## 2019-04-24 DIAGNOSIS — G89.4 CHRONIC PAIN SYNDROME: ICD-10-CM

## 2019-04-24 DIAGNOSIS — F11.20 SEVERE OPIOID USE DISORDER (HCC): Primary | ICD-10-CM

## 2019-04-24 DIAGNOSIS — F31.9 BIPOLAR 1 DISORDER (HCC): ICD-10-CM

## 2019-04-24 LAB
AMPHETAMINE SCREEN, URINE: ABNORMAL
BARBITURATE SCREEN, URINE: ABNORMAL
BENZODIAZEPINE SCREEN, URINE: ABNORMAL
BUPRENORPHINE URINE: ABNORMAL
COCAINE METABOLITE SCREEN URINE: ABNORMAL
GABAPENTIN SCREEN, URINE: ABNORMAL
MDMA URINE: ABNORMAL
METHADONE SCREEN, URINE: ABNORMAL
METHAMPHETAMINE, URINE: ABNORMAL
OPIATE SCREEN URINE: ABNORMAL
OXYCODONE SCREEN URINE: ABNORMAL
PHENCYCLIDINE SCREEN URINE: ABNORMAL
PROPOXYPHENE SCREEN, URINE: ABNORMAL
THC SCREEN, URINE: ABNORMAL
TRICYCLIC ANTIDEPRESSANTS, UR: ABNORMAL

## 2019-04-24 PROCEDURE — G8417 CALC BMI ABV UP PARAM F/U: HCPCS | Performed by: INTERNAL MEDICINE

## 2019-04-24 PROCEDURE — G8427 DOCREV CUR MEDS BY ELIG CLIN: HCPCS | Performed by: INTERNAL MEDICINE

## 2019-04-24 PROCEDURE — 99204 OFFICE O/P NEW MOD 45 MIN: CPT | Performed by: INTERNAL MEDICINE

## 2019-04-24 PROCEDURE — 4004F PT TOBACCO SCREEN RCVD TLK: CPT | Performed by: INTERNAL MEDICINE

## 2019-04-24 PROCEDURE — 80305 DRUG TEST PRSMV DIR OPT OBS: CPT | Performed by: INTERNAL MEDICINE

## 2019-04-24 RX ORDER — GABAPENTIN 800 MG/1
800 TABLET ORAL 3 TIMES DAILY
COMMUNITY
End: 2019-04-24 | Stop reason: SDUPTHER

## 2019-04-24 RX ORDER — PROMETHAZINE HYDROCHLORIDE 25 MG/1
25 TABLET ORAL EVERY 6 HOURS PRN
COMMUNITY
End: 2019-07-24 | Stop reason: SDUPTHER

## 2019-04-24 RX ORDER — GABAPENTIN 800 MG/1
800 TABLET ORAL 3 TIMES DAILY
Qty: 90 TABLET | Refills: 0 | Status: SHIPPED | OUTPATIENT
Start: 2019-04-24 | End: 2019-05-22 | Stop reason: SDUPTHER

## 2019-04-24 RX ORDER — BUPRENORPHINE AND NALOXONE 8; 2 MG/1; MG/1
1 FILM, SOLUBLE BUCCAL; SUBLINGUAL 2 TIMES DAILY
Qty: 14 FILM | Refills: 0 | Status: CANCELLED | OUTPATIENT
Start: 2019-04-24 | End: 2019-05-01

## 2019-05-01 ENCOUNTER — OFFICE VISIT (OUTPATIENT)
Dept: INTERNAL MEDICINE CLINIC | Age: 39
End: 2019-05-01
Payer: MEDICARE

## 2019-05-01 VITALS
WEIGHT: 221 LBS | HEART RATE: 88 BPM | SYSTOLIC BLOOD PRESSURE: 155 MMHG | DIASTOLIC BLOOD PRESSURE: 98 MMHG | HEIGHT: 66 IN | BODY MASS INDEX: 35.52 KG/M2

## 2019-05-01 DIAGNOSIS — F31.9 BIPOLAR 1 DISORDER (HCC): ICD-10-CM

## 2019-05-01 DIAGNOSIS — G89.4 CHRONIC PAIN SYNDROME: ICD-10-CM

## 2019-05-01 DIAGNOSIS — Z79.899 ENCOUNTER FOR MONITORING SUBOXONE MAINTENANCE THERAPY: ICD-10-CM

## 2019-05-01 DIAGNOSIS — F11.20 SEVERE OPIOID USE DISORDER (HCC): Primary | ICD-10-CM

## 2019-05-01 DIAGNOSIS — Z51.81 ENCOUNTER FOR MONITORING SUBOXONE MAINTENANCE THERAPY: ICD-10-CM

## 2019-05-01 PROCEDURE — 4004F PT TOBACCO SCREEN RCVD TLK: CPT | Performed by: INTERNAL MEDICINE

## 2019-05-01 PROCEDURE — 80305 DRUG TEST PRSMV DIR OPT OBS: CPT | Performed by: INTERNAL MEDICINE

## 2019-05-01 PROCEDURE — G8417 CALC BMI ABV UP PARAM F/U: HCPCS | Performed by: INTERNAL MEDICINE

## 2019-05-01 PROCEDURE — 99213 OFFICE O/P EST LOW 20 MIN: CPT | Performed by: INTERNAL MEDICINE

## 2019-05-01 PROCEDURE — G8427 DOCREV CUR MEDS BY ELIG CLIN: HCPCS | Performed by: INTERNAL MEDICINE

## 2019-05-01 RX ORDER — BUPRENORPHINE AND NALOXONE 8; 2 MG/1; MG/1
1 FILM, SOLUBLE BUCCAL; SUBLINGUAL 2 TIMES DAILY
Qty: 14 FILM | Refills: 0 | Status: SHIPPED | OUTPATIENT
Start: 2019-05-01 | End: 2019-05-08 | Stop reason: SDUPTHER

## 2019-05-01 NOTE — PROGRESS NOTES
Verbal order per Dr. Cory Garcia for urine drug screen. Positive for BUP. Verified results with Eliza Escamilla. Dr. Cory Garcia ordered Suboxone 8 mg film BID for patient. Verified dose with patient. Patient was sent home with 1 week script for Suboxone 8 mg film BID and will be seen back in the office on 5/8/2019.

## 2019-05-01 NOTE — PROGRESS NOTES
[facility] PROFESSIONAL SERVS  PHYSICIANS [place] [address] [place] [address], [address]  Dept: [phone]  Dept Fax: [phone]      Chief Complaint   Patient presents with    Drug Problem       Patient presents for evaluation of opioid use. I saw her 4/24. This patient is followed regularly by  Roberta Lui CNP. Patient said she is [place] the last 6 [place] [address] [place] by Roberta Lui. She gives her Suboxone as well as her psych medications. She says she wants to come here because she was told she had to take the Suboxone shot. She had no choice. She started using pain pills at age 23. She was taking up to 10 Percocets daily. History of cocaine, none recently. She's been on Suboxone several times, but relapsed. Most recently after an inpatient stay  Past Medical History:   Diagnosis Date    COPD (chronic obstructive [facility] disease) ([facility]     Diabetes mellitus ([facility]     Drug addiction ([facility]     HPV (human papilloma virus) infection        Current Outpatient Medications   Medication Sig Dispense Refill    buprenorphine-naloxone (SUBOXONE) 8-2 MG FILM SL film Place 1 Film under the tongue 2 times daily for 7 days. 14 Film 0    promethazine (PHENERGAN) 25 MG tablet Take 25 mg by mouth every 6 hours as needed for Nausea      gabapentin (NEURONTIN) 800 MG tablet Take 1 tablet by mouth 3 times daily for 30 days.  90 tablet 0    insulin lispro (HUMALOG) 100 UNIT/ML injection vial Inject into the skin 3 times daily (before meals) sliding scale      ondansetron (ZOFRAN ODT) 4 MG disintegrating tablet Take 1 tablet by mouth every 8 hours as needed for Nausea 10 tablet 0    venlafaxine (EFFEXOR) 37.5 MG tablet Take 37.5 mg by mouth daily      QUEtiapine (SEROQUEL XR) 200 MG extended release tablet Take 200 mg by mouth nightly      albuterol sulfate HFA (PROVENTIL HFA) 108 (90 Base) MCG/ACT inhaler Inhale 2 puffs into the lungs every 6 hours as needed for Wheezing 1 Inhaler 3    furosemide (LASIX) 40 MG tablet Take 1 tablet by mouth daily. 30 tablet 5    QUEtiapine (SEROQUEL) 25 MG tablet Take 100 mg by mouth daily       ibuprofen (ADVIL;MOTRIN) 800 MG tablet Take 800 mg by mouth every 6 hours as needed. No current facility-administered medications for this visit. Review of Systems - . She says she has some urges, but she thinks is from her anxiety    Blood pressure (!) 155/98, pulse 88, height 5' 6\" (1.676 m), weight 221 lb (100.2 kg), not currently breastfeeding. Physical Examination: General appearance - alert, well appearing, and in no distress  HEENT-head normocephalic, atraumatic  Mental status - alert, oriented to person, place, and time    Urine tox screen today. Amphetamine Screen, Urine 05/01/2019  8:00 AM Unknown   NEG    Barbiturate Screen, Urine 05/01/2019  8:00 AM Unknown   NEG    Benzodiazepine Screen, Urine 05/01/2019  8:00 AM Unknown   NEG    Buprenorphine Urine 05/01/2019  8:00 AM Unknown   POS    Cocaine Metabolite Screen, Urine 05/01/2019  8:00 AM Unknown   NEG    Gabapentin Screen, Urine 05/01/2019  8:00 AM Unknown   N/A    MDMA, Urine 05/01/2019  8:00 AM Unknown   NEG    Methamphetamine, Urine 05/01/2019  8:00 AM Unknown   NEG    Methadone Screen, Urine 05/01/2019  8:00 AM Unknown   NEG    Opiate Scrn, Ur 05/01/2019  8:00 AM Unknown   NEG    Oxycodone Screen, Ur 05/01/2019  8:00 AM Unknown   NEG    PCP Screen, Urine 05/01/2019  8:00 AM Unknown   NEG    Propoxyphene Screen, Urine 05/01/2019  8:00 AM Unknown   N/A    THC Screen, Urine 05/01/2019  8:00 AM Unknown   NEG    Tricyclic Antidepressants, Urine 05/01/2019  8:00 AM Unknown   N/A         Diagnosis Orders   1. Severe opioid use disorder (HCC)  POCT Rapid Drug Screen    buprenorphine-naloxone (SUBOXONE) 8-2 MG FILM SL film   2. Bipolar 1 disorder ([facility]     3. Chronic pain syndrome     4.  Encounter for monitoring Suboxone maintenance therapy         Orders Placed This Encounter

## 2019-05-08 ENCOUNTER — OFFICE VISIT (OUTPATIENT)
Dept: INTERNAL MEDICINE CLINIC | Age: 39
End: 2019-05-08
Payer: MEDICARE

## 2019-05-08 VITALS
HEART RATE: 95 BPM | DIASTOLIC BLOOD PRESSURE: 84 MMHG | WEIGHT: 223 LBS | BODY MASS INDEX: 35.84 KG/M2 | HEIGHT: 66 IN | SYSTOLIC BLOOD PRESSURE: 146 MMHG

## 2019-05-08 DIAGNOSIS — G89.4 CHRONIC PAIN SYNDROME: ICD-10-CM

## 2019-05-08 DIAGNOSIS — F11.20 SEVERE OPIOID USE DISORDER (HCC): Primary | ICD-10-CM

## 2019-05-08 DIAGNOSIS — Z51.81 ENCOUNTER FOR MONITORING SUBOXONE MAINTENANCE THERAPY: ICD-10-CM

## 2019-05-08 DIAGNOSIS — F31.9 BIPOLAR 1 DISORDER (HCC): ICD-10-CM

## 2019-05-08 DIAGNOSIS — Z79.899 ENCOUNTER FOR MONITORING SUBOXONE MAINTENANCE THERAPY: ICD-10-CM

## 2019-05-08 PROCEDURE — 99213 OFFICE O/P EST LOW 20 MIN: CPT | Performed by: INTERNAL MEDICINE

## 2019-05-08 PROCEDURE — G8417 CALC BMI ABV UP PARAM F/U: HCPCS | Performed by: INTERNAL MEDICINE

## 2019-05-08 PROCEDURE — 4004F PT TOBACCO SCREEN RCVD TLK: CPT | Performed by: INTERNAL MEDICINE

## 2019-05-08 PROCEDURE — G8427 DOCREV CUR MEDS BY ELIG CLIN: HCPCS | Performed by: INTERNAL MEDICINE

## 2019-05-08 PROCEDURE — 80305 DRUG TEST PRSMV DIR OPT OBS: CPT | Performed by: INTERNAL MEDICINE

## 2019-05-08 RX ORDER — BUPRENORPHINE AND NALOXONE 8; 2 MG/1; MG/1
1 FILM, SOLUBLE BUCCAL; SUBLINGUAL 2 TIMES DAILY
Qty: 14 FILM | Refills: 0 | Status: SHIPPED | OUTPATIENT
Start: 2019-05-08 | End: 2019-05-15 | Stop reason: SDUPTHER

## 2019-05-08 RX ORDER — BUPRENORPHINE AND NALOXONE 2; .5 MG/1; MG/1
1 FILM, SOLUBLE BUCCAL; SUBLINGUAL DAILY
Qty: 7 FILM | Refills: 0 | Status: SHIPPED | OUTPATIENT
Start: 2019-05-08 | End: 2019-05-15 | Stop reason: SDUPTHER

## 2019-05-08 NOTE — PROGRESS NOTES
[facility] PROFESSIONAL SERVS  PHYSICIANS [place] [address] [place] [address], [address]  Dept: [phone]  Dept Fax: [phone]      Chief Complaint   Patient presents with    Drug Problem       Patient presents for evaluation of opioid use. I saw her 5/1. This patient is followed regularly by  Char Teran CNP. Patient said she is followed for the last 6 months [place] [address] [place] by Char Teran. She gives her Suboxone as well as her psych medications. She says she wants to come here because she was told she had to take the Suboxone shot. She had no choice. She started using pain pills at age 23. She was taking up to 10 Percocets daily. History of cocaine, none recently. She's been on Suboxone several times, but relapsed. Most recently after an inpatient stay  Past Medical History:   Diagnosis Date    COPD (chronic obstructive pulmonary disease) ([facility]     Diabetes mellitus ([facility]     Drug addiction ([facility]     HPV (human papilloma virus) infection        Current Outpatient Medications   Medication Sig Dispense Refill    buprenorphine-naloxone (SUBOXONE) 8-2 MG FILM SL film Place 1 Film under the tongue 2 times daily for 7 days. 14 Film 0    buprenorphine-naloxone (SUBOXONE) 2-0.5 MG FILM SL film Place 1 Film under the tongue daily for 7 days. 7 Film 0    promethazine (PHENERGAN) 25 MG tablet Take 25 mg by mouth every 6 hours as needed for Nausea      gabapentin (NEURONTIN) 800 MG tablet Take 1 tablet by mouth 3 times daily for 30 days.  90 tablet 0    insulin lispro (HUMALOG) 100 UNIT/ML injection vial Inject into the skin 3 times daily (before meals) sliding scale      venlafaxine (EFFEXOR) 37.5 MG tablet Take 37.5 mg by mouth daily      QUEtiapine (SEROQUEL XR) 200 MG extended release tablet Take 200 mg by mouth nightly      albuterol sulfate HFA (PROVENTIL HFA) 108 (90 Base) MCG/ACT inhaler Inhale 2 puffs into the lungs every 6 hours as needed for Wheezing 1 Inhaler 3  furosemide (LASIX) 40 MG tablet Take 1 tablet by mouth daily. 30 tablet 5    QUEtiapine (SEROQUEL) 25 MG tablet Take 100 mg by mouth daily       ibuprofen (ADVIL;MOTRIN) 800 MG tablet Take 800 mg by mouth every 6 hours as needed. No current facility-administered medications for this visit. Review of Systems - . She says she has some urges, but she thinks is from her anxiety    Blood pressure (!) 146/84, pulse 95, height 5' 6\" (1.676 m), weight 223 lb (101.2 kg), not currently breastfeeding. Physical Examination: General appearance - alert, well appearing, and in no distress  HEENT-head normocephalic, atraumatic  Mental status - alert, oriented to person, place, and time    Urine tox screen today. Amphetamine Screen, Urine 05/08/2019  8:25 AM Unknown   NEG    Barbiturate Screen, Urine 05/08/2019  8:25 AM Unknown   NEG    Benzodiazepine Screen, Urine 05/08/2019  8:25 AM Unknown   NEG    Buprenorphine Urine 05/08/2019  8:25 AM Unknown   POS    Cocaine Metabolite Screen, Urine 05/08/2019  8:25 AM Unknown   NEG    Gabapentin Screen, Urine 05/08/2019  8:25 AM Unknown   N/A    MDMA, Urine 05/08/2019  8:25 AM Unknown   NEG    Methamphetamine, Urine 05/08/2019  8:25 AM Unknown   NEG    Methadone Screen, Urine 05/08/2019  8:25 AM Unknown   NEG    Opiate Scrn, Ur 05/08/2019  8:25 AM Unknown   NEG    Oxycodone Screen, Ur 05/08/2019  8:25 AM Unknown   NEG    PCP Screen, Urine 05/08/2019  8:25 AM Unknown   NEG    Propoxyphene Screen, Urine 05/08/2019  8:25 AM Unknown   N/A    THC Screen, Urine 05/08/2019  8:25 AM Unknown   NEG    Tricyclic Antidepressants, Urine 05/08/2019  8:25 AM Unknown   N/A         Diagnosis Orders   1. Severe opioid use disorder (HCC)  POCT Rapid Drug Screen    buprenorphine-naloxone (SUBOXONE) 8-2 MG FILM SL film    buprenorphine-naloxone (SUBOXONE) 2-0.5 MG FILM SL film   2. Bipolar 1 disorder ([facility]     3. Chronic pain syndrome     4.  Encounter for monitoring Suboxone maintenance therapy         Orders Placed This Encounter   Procedures    POCT Rapid Drug Screen     I told the patient here she is not forced to take the shot. It is available if she would like to pursue it. She is on 8 mg twice daily. She has a history of psychiatric issues including bipolar.   .  She also has chronic pain syndrome and is on Neurontin  I  set her up to see Dr. Naseem Plata

## 2019-05-15 ENCOUNTER — OFFICE VISIT (OUTPATIENT)
Dept: INTERNAL MEDICINE CLINIC | Age: 39
End: 2019-05-15
Payer: MEDICARE

## 2019-05-15 ENCOUNTER — TELEPHONE (OUTPATIENT)
Dept: PSYCHIATRY | Age: 39
End: 2019-05-15

## 2019-05-15 VITALS
HEIGHT: 66 IN | WEIGHT: 216 LBS | BODY MASS INDEX: 34.72 KG/M2 | SYSTOLIC BLOOD PRESSURE: 150 MMHG | HEART RATE: 91 BPM | DIASTOLIC BLOOD PRESSURE: 94 MMHG

## 2019-05-15 DIAGNOSIS — F60.3 BORDERLINE PERSONALITY DISORDER (HCC): ICD-10-CM

## 2019-05-15 DIAGNOSIS — F11.21 SEVERE OPIOID USE DISORDER, IN SUSTAINED REMISSION, ON MAINTENANCE THERAPY (HCC): ICD-10-CM

## 2019-05-15 DIAGNOSIS — Z51.81 ENCOUNTER FOR MONITORING SUBOXONE MAINTENANCE THERAPY: ICD-10-CM

## 2019-05-15 DIAGNOSIS — G89.4 CHRONIC PAIN SYNDROME: ICD-10-CM

## 2019-05-15 DIAGNOSIS — L02.91 ABSCESS: ICD-10-CM

## 2019-05-15 DIAGNOSIS — F31.9 BIPOLAR 1 DISORDER (HCC): ICD-10-CM

## 2019-05-15 DIAGNOSIS — F11.20 SEVERE OPIOID USE DISORDER (HCC): Primary | ICD-10-CM

## 2019-05-15 DIAGNOSIS — F41.9 ANXIETY: ICD-10-CM

## 2019-05-15 DIAGNOSIS — F41.9 ANXIETY: Primary | ICD-10-CM

## 2019-05-15 DIAGNOSIS — Z79.899 ENCOUNTER FOR MONITORING SUBOXONE MAINTENANCE THERAPY: ICD-10-CM

## 2019-05-15 PROCEDURE — 99213 OFFICE O/P EST LOW 20 MIN: CPT | Performed by: INTERNAL MEDICINE

## 2019-05-15 PROCEDURE — 90792 PSYCH DIAG EVAL W/MED SRVCS: CPT | Performed by: PSYCHIATRY & NEUROLOGY

## 2019-05-15 PROCEDURE — G8417 CALC BMI ABV UP PARAM F/U: HCPCS | Performed by: INTERNAL MEDICINE

## 2019-05-15 PROCEDURE — G8427 DOCREV CUR MEDS BY ELIG CLIN: HCPCS | Performed by: INTERNAL MEDICINE

## 2019-05-15 PROCEDURE — 4004F PT TOBACCO SCREEN RCVD TLK: CPT | Performed by: INTERNAL MEDICINE

## 2019-05-15 PROCEDURE — 80305 DRUG TEST PRSMV DIR OPT OBS: CPT | Performed by: INTERNAL MEDICINE

## 2019-05-15 PROCEDURE — 4004F PT TOBACCO SCREEN RCVD TLK: CPT | Performed by: PSYCHIATRY & NEUROLOGY

## 2019-05-15 RX ORDER — QUETIAPINE FUMARATE 25 MG/1
100 TABLET, FILM COATED ORAL DAILY
Qty: 120 TABLET | Refills: 2 | Status: SHIPPED | OUTPATIENT
Start: 2019-05-15 | End: 2019-09-04 | Stop reason: SDUPTHER

## 2019-05-15 RX ORDER — BUPRENORPHINE AND NALOXONE 8; 2 MG/1; MG/1
1 FILM, SOLUBLE BUCCAL; SUBLINGUAL 2 TIMES DAILY
Qty: 14 FILM | Refills: 0 | Status: SHIPPED | OUTPATIENT
Start: 2019-05-15 | End: 2019-05-22 | Stop reason: SDUPTHER

## 2019-05-15 RX ORDER — QUETIAPINE 200 MG/1
200 TABLET, FILM COATED, EXTENDED RELEASE ORAL NIGHTLY
Qty: 30 TABLET | Refills: 3 | Status: SHIPPED | OUTPATIENT
Start: 2019-05-15 | End: 2019-11-06 | Stop reason: SDUPTHER

## 2019-05-15 RX ORDER — MIRTAZAPINE 15 MG/1
15 TABLET, FILM COATED ORAL NIGHTLY
Qty: 30 TABLET | Refills: 3 | Status: SHIPPED | OUTPATIENT
Start: 2019-05-15 | End: 2019-05-15 | Stop reason: SDUPTHER

## 2019-05-15 RX ORDER — MIRTAZAPINE 15 MG/1
15 TABLET, FILM COATED ORAL NIGHTLY
Qty: 30 TABLET | Refills: 3 | Status: SHIPPED | OUTPATIENT
Start: 2019-05-15 | End: 2019-05-22 | Stop reason: ALTCHOICE

## 2019-05-15 RX ORDER — VENLAFAXINE 75 MG/1
225 TABLET ORAL DAILY
COMMUNITY
End: 2020-01-21 | Stop reason: SDUPTHER

## 2019-05-15 RX ORDER — CLINDAMYCIN HYDROCHLORIDE 300 MG/1
600 CAPSULE ORAL 3 TIMES DAILY
Qty: 42 CAPSULE | Refills: 0 | Status: SHIPPED | OUTPATIENT
Start: 2019-05-15 | End: 2019-05-22 | Stop reason: ALTCHOICE

## 2019-05-15 RX ORDER — BUPRENORPHINE AND NALOXONE 2; .5 MG/1; MG/1
1 FILM, SOLUBLE BUCCAL; SUBLINGUAL DAILY
Qty: 7 FILM | Refills: 0 | Status: SHIPPED | OUTPATIENT
Start: 2019-05-15 | End: 2019-05-22 | Stop reason: SDUPTHER

## 2019-05-15 NOTE — PROGRESS NOTES
[facility] SERVS  PHYSICIANS [place] [address] [address], Dimit[address]  Dept: [phone]  Dept Fax: [phone]      Chief Complaint   Patient presents with    Drug Problem       Patient presents for evaluation of opioid use. I saw her 5/8. This patient is followed regularly by  Agustina De Leon CNP. Patient said she is followed for the last 6 months [place] [address] [place] by Agustina De Leon. She gives her Suboxone as well as her psych medications. She says she wants to come here because she was told she had to take the Suboxone shot. She had no choice. She started using pain pills at age 23. She was taking up to 10 Percocets daily. History of cocaine, none recently. She's been on Suboxone several times, but relapsed. Most recently after an inpatient stay    . She says she has developed an abscess under her right arm. She says she has a history of these. She said it is draining foul-smelling material.  It is red and hot  Past Medical History:   Diagnosis Date    [facility] (chronic obstructive [facility] disease) ([facility]     Diabetes mellitus ([facility]     Drug addiction ([facility]     HPV (human papilloma virus) infection        Current Outpatient Medications   Medication Sig Dispense Refill    venlafaxine (EFFEXOR) 75 MG tablet Take 225 mg by mouth daily      buprenorphine-naloxone (SUBOXONE) 8-2 MG FILM SL film Place 1 Film under the tongue 2 times daily for 7 days. 14 Film 0    buprenorphine-naloxone (SUBOXONE) 2-0.5 MG FILM SL film Place 1 Film under the tongue daily for 7 days. 7 Film 0    clindamycin (CLEOCIN) 300 MG capsule Take 2 capsules by mouth 3 times daily for 7 days 42 capsule 0    promethazine (PHENERGAN) 25 MG tablet Take 25 mg by mouth every 6 hours as needed for Nausea      gabapentin (NEURONTIN) 800 MG tablet Take 1 tablet by mouth 3 times daily for 30 days.  90 tablet 0    insulin lispro (HUMALOG) 100 UNIT/ML injection vial Inject into the skin 3 times daily Unknown   N/A    THC Screen, Urine 05/15/2019  9:25 AM Unknown   NEG    Tricyclic Antidepressants, Urine 05/15/2019  9:25 AM Unknown   N/A         Diagnosis Orders   1. Severe opioid use disorder (HCC)  POCT Rapid Drug Screen    buprenorphine-naloxone (SUBOXONE) 8-2 MG FILM SL film    buprenorphine-naloxone (SUBOXONE) 2-0.5 MG FILM SL film   2. Abscess  clindamycin (CLEOCIN) 300 MG capsule   3. Anxiety     4. Severe opioid use disorder, in sustained remission, on maintenance therapy ([facility]     5. Borderline personality disorder ([facility]     6. Chronic pain syndrome     7. Encounter for monitoring Suboxone maintenance therapy         Orders Placed This Encounter   Procedures    POCT Rapid Drug Screen     I told the patient here she is not forced to take the shot. It is available if she would like to pursue it. She is on 8 mg twice daily. She has a history of psychiatric issues including bipolar.   .  She also has chronic pain syndrome and is on Neurontin  I  set her up to see Dr. Pio Graham  Clindamycin 600 mg 3 times a day for 7 days     follow-up 1 week

## 2019-05-15 NOTE — PROGRESS NOTES
Verbal order per Dr. Angelica Smith for urine drug screen. Positive for BUP. Verified results with John Lopez. Dr. Angelica Smith ordered Suboxone 8 mg film BID and Suboxone 2 mg film daily PRN for patient. Verified dose with patient. Patient was sent home with 1 week script for Suboxone 8 film BID and Suboxone 2 mg film daily PRN and will be seen back in the office on 5/22/2019. Script for clindamycin 600 mg TID for 1 week sent to [address] [place] [address] for abcess under arm.
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
## 2019-05-17 RX ORDER — QUETIAPINE FUMARATE 50 MG/1
50 TABLET, FILM COATED ORAL NIGHTLY
Qty: 30 TABLET | Refills: 1 | Status: SHIPPED | OUTPATIENT
Start: 2019-05-17 | End: 2019-09-04 | Stop reason: SDUPTHER

## 2019-05-17 NOTE — TELEPHONE ENCOUNTER
I spoke with Zack Howard and she does not want to add the mirtazapine at bedtime, she would prefer to increase her evening quetiapine dose to 250 mg, which is fine with me. Would you mind loading an extra quetiapine 50mg qHS #30 with 1 refill?  I think going forward Dr. BELL MERT [facility] office should be able to message me directly, it's another kink we will need to work out  Thank you

## 2019-05-17 NOTE — TELEPHONE ENCOUNTER
Trice called the office to inquire if she was to have an increase in Seroquel since her initial visit on 5/15/19 in Dr. Duyen Ellis office. She states that she is currently taking Seorquel XR 200mg/hs and Seroquel 100mg/d but she thought that it may be increased by another 50mg. She states that she called Dr. Duyen Ellis office and they directed her to call our office to discuss. Trice uses [facility].     She attended an initial appointment on 5/15/19 and is scheduled to return 5/29/19

## 2019-05-22 ENCOUNTER — OFFICE VISIT (OUTPATIENT)
Dept: INTERNAL MEDICINE CLINIC | Age: 39
End: 2019-05-22
Payer: MEDICARE

## 2019-05-22 VITALS
BODY MASS INDEX: 34.87 KG/M2 | WEIGHT: 217 LBS | SYSTOLIC BLOOD PRESSURE: 138 MMHG | DIASTOLIC BLOOD PRESSURE: 89 MMHG | HEART RATE: 81 BPM | HEIGHT: 66 IN

## 2019-05-22 DIAGNOSIS — Z79.899 ENCOUNTER FOR MONITORING SUBOXONE MAINTENANCE THERAPY: ICD-10-CM

## 2019-05-22 DIAGNOSIS — F60.3 BORDERLINE PERSONALITY DISORDER (HCC): ICD-10-CM

## 2019-05-22 DIAGNOSIS — F11.20 SEVERE OPIOID USE DISORDER (HCC): Primary | ICD-10-CM

## 2019-05-22 DIAGNOSIS — Z51.81 ENCOUNTER FOR MONITORING SUBOXONE MAINTENANCE THERAPY: ICD-10-CM

## 2019-05-22 DIAGNOSIS — G89.4 CHRONIC PAIN SYNDROME: ICD-10-CM

## 2019-05-22 PROCEDURE — 4004F PT TOBACCO SCREEN RCVD TLK: CPT | Performed by: INTERNAL MEDICINE

## 2019-05-22 PROCEDURE — 99213 OFFICE O/P EST LOW 20 MIN: CPT | Performed by: INTERNAL MEDICINE

## 2019-05-22 PROCEDURE — G8427 DOCREV CUR MEDS BY ELIG CLIN: HCPCS | Performed by: INTERNAL MEDICINE

## 2019-05-22 PROCEDURE — 80305 DRUG TEST PRSMV DIR OPT OBS: CPT | Performed by: INTERNAL MEDICINE

## 2019-05-22 PROCEDURE — G8417 CALC BMI ABV UP PARAM F/U: HCPCS | Performed by: INTERNAL MEDICINE

## 2019-05-22 RX ORDER — BUPRENORPHINE AND NALOXONE 2; .5 MG/1; MG/1
1 FILM, SOLUBLE BUCCAL; SUBLINGUAL DAILY
Qty: 14 FILM | Refills: 0 | Status: SHIPPED | OUTPATIENT
Start: 2019-05-22 | End: 2019-06-05 | Stop reason: SDUPTHER

## 2019-05-22 RX ORDER — BUPRENORPHINE AND NALOXONE 8; 2 MG/1; MG/1
1 FILM, SOLUBLE BUCCAL; SUBLINGUAL 2 TIMES DAILY
Qty: 28 FILM | Refills: 0 | Status: SHIPPED | OUTPATIENT
Start: 2019-05-22 | End: 2019-06-05 | Stop reason: SDUPTHER

## 2019-05-22 RX ORDER — GABAPENTIN 800 MG/1
800 TABLET ORAL 3 TIMES DAILY
Qty: 90 TABLET | Refills: 0 | Status: SHIPPED | OUTPATIENT
Start: 2019-05-22 | End: 2019-06-19 | Stop reason: SDUPTHER

## 2019-05-22 NOTE — PROGRESS NOTES
[facility] PROFESSIONAL SERVS  PHYSICIANS [facility] [address], Dimitrios [address]  Dept: [phone]  Dept Fax: [phone]      Chief Complaint   Patient presents with    Drug Problem       Patient presents for evaluation of opioid use. I saw her 5/15. This patient is followed regularly by  Kiah Levy CNP. Patient said she is followed for the last 6 months at the [address] [place] [address] clinic by Kiah Levy. She gives her Suboxone as well as her psych medications. She says she wants to come here because she was told she had to take the Suboxone shot. She had no choice. She started using pain pills at age 23. She was taking up to 10 Percocets daily. History of cocaine, none recently. She's been on Suboxone several times, but relapsed. Most recently after an inpatient stay    . She says she has developed an abscess under her right arm. She took clindamycin abscess better  She says she has a history of these. Past Medical History:   Diagnosis Date    COPD (chronic obstructive pulmonary disease) ([facility]     Diabetes mellitus ([facility]     Drug addiction ([facility]     HPV (human papilloma virus) infection        Current Outpatient Medications   Medication Sig Dispense Refill    buprenorphine-naloxone (SUBOXONE) 8-2 MG FILM SL film Place 1 Film under the tongue 2 times daily for 14 days. 28 Film 0    buprenorphine-naloxone (SUBOXONE) 2-0.5 MG FILM SL film Place 1 Film under the tongue daily for 14 days. 14 Film 0    gabapentin (NEURONTIN) 800 MG tablet Take 1 tablet by mouth 3 times daily for 30 days.  90 tablet 0    QUEtiapine (SEROQUEL) 50 MG tablet Take 1 tablet by mouth nightly 30 tablet 1    venlafaxine (EFFEXOR) 75 MG tablet Take 225 mg by mouth daily      QUEtiapine (SEROQUEL XR) 200 MG extended release tablet Take 1 tablet by mouth nightly 30 tablet 3    QUEtiapine (SEROQUEL) 25 MG tablet Take 4 tablets by mouth daily 120 tablet 2    promethazine (PHENERGAN) 25 MG tablet Take 25 Rapid Drug Screen    buprenorphine-naloxone (SUBOXONE) 8-2 MG FILM SL film    buprenorphine-naloxone (SUBOXONE) 2-0.5 MG FILM SL film    gabapentin (NEURONTIN) 800 MG tablet   2. Borderline personality disorder ([facility]     3. Chronic pain syndrome     4. Encounter for monitoring Suboxone maintenance therapy         Orders Placed This Encounter   Procedures    POCT Rapid Drug Screen     I told the patient here she is not forced to take the shot. It is available if she would like to pursue it. She is on 8 mg twice daily. I added 2 mg a week ago  She has a history of psychiatric issues including bipolar.   .  She also has chronic pain syndrome and is on Neurontin  I  set her up to see Dr. Anayeli Velazquez       follow-up 2 weeks

## 2019-06-03 ENCOUNTER — APPOINTMENT (OUTPATIENT)
Dept: GENERAL RADIOLOGY | Age: 39
End: 2019-06-03
Payer: OTHER MISCELLANEOUS

## 2019-06-03 ENCOUNTER — HOSPITAL ENCOUNTER (EMERGENCY)
Age: 39
Discharge: HOME | End: 2019-06-03
Attending: EMERGENCY MEDICINE
Payer: OTHER MISCELLANEOUS

## 2019-06-03 VITALS
SYSTOLIC BLOOD PRESSURE: 123 MMHG | HEIGHT: 66 IN | BODY MASS INDEX: 34.23 KG/M2 | HEART RATE: 99 BPM | DIASTOLIC BLOOD PRESSURE: 94 MMHG | TEMPERATURE: 98.4 F | WEIGHT: 213 LBS | RESPIRATION RATE: 16 BRPM | OXYGEN SATURATION: 95 %

## 2019-06-03 DIAGNOSIS — S16.1XXA ACUTE STRAIN OF NECK MUSCLE, INITIAL ENCOUNTER: Primary | ICD-10-CM
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
## 2019-06-03 DIAGNOSIS — S39.012A BACK STRAIN, INITIAL ENCOUNTER: ICD-10-CM

## 2019-06-03 DIAGNOSIS — M51.36 DEGENERATION, INTERVERTEBRAL DISC, LUMBAR: ICD-10-CM

## 2019-06-03 PROCEDURE — 72040 X-RAY EXAM NECK SPINE 2-3 VW: CPT

## 2019-06-03 PROCEDURE — 99284 EMERGENCY DEPT VISIT MOD MDM: CPT

## 2019-06-03 PROCEDURE — 72100 X-RAY EXAM L-S SPINE 2/3 VWS: CPT

## 2019-06-03 ASSESSMENT — ENCOUNTER SYMPTOMS
ABDOMINAL PAIN: 0
VOMITING: 0
EYE PAIN: 0
BACK PAIN: 0
SORE THROAT: 0
EYE DISCHARGE: 0
NAUSEA: 0
DIARRHEA: 0
RHINORRHEA: 0
SHORTNESS OF BREATH: 0
WHEEZING: 0
COUGH: 0

## 2019-06-03 ASSESSMENT — PAIN DESCRIPTION - LOCATION: LOCATION: HAND;NECK

## 2019-06-03 ASSESSMENT — PAIN DESCRIPTION - PAIN TYPE
TYPE: ACUTE PAIN
TYPE: ACUTE PAIN

## 2019-06-03 ASSESSMENT — PAIN SCALES - GENERAL
PAINLEVEL_OUTOF10: 10
PAINLEVEL_OUTOF10: 10

## 2019-06-03 NOTE — ED PROVIDER NOTES
Diabetes mellitus ([facility] Drug addiction ([facility] HPV (human papilloma virus) infection, and Hypertension. SURGICAL HISTORY     has a past surgical history that includes Foot surgery (); Dilation and curettage of uterus (); Cervix biopsy (); Axillary Surgery (left , rt );  section (); and other surgical history (2012). CURRENT MEDICATIONS    Discharge Medication List as of 6/3/2019  5:12 PM      CONTINUE these medications which have NOT CHANGED    Details   buprenorphine-naloxone (SUBOXONE) 8-2 MG FILM SL film Place 1 Film under the tongue 2 times daily for 14 days. , Disp-28 Film, R-0Print      buprenorphine-naloxone (SUBOXONE) 2-0.5 MG FILM SL film Place 1 Film under the tongue daily for 14 days. , Disp-14 Film, R-0Print      gabapentin (NEURONTIN) 800 MG tablet Take 1 tablet by mouth 3 times daily for 30 days. , Disp-90 tablet, R-0Print      !! QUEtiapine (SEROQUEL) 50 MG tablet Take 1 tablet by mouth nightly, Disp-30 tablet, R-1Normal      venlafaxine (EFFEXOR) 75 MG tablet Take 225 mg by mouth dailyHistorical Med      QUEtiapine (SEROQUEL XR) 200 MG extended release tablet Take 1 tablet by mouth nightly, Disp-30 tablet, R-3Normal      !! QUEtiapine (SEROQUEL) 25 MG tablet Take 4 tablets by mouth daily, Disp-120 tablet, R-2Normal      promethazine (PHENERGAN) 25 MG tablet Take 25 mg by mouth every 6 hours as needed for NauseaHistorical Med      insulin lispro (HUMALOG) 100 UNIT/ML injection vial Inject into the skin 3 times daily (before meals) sliding scaleHistorical Med      albuterol sulfate HFA (PROVENTIL HFA) 108 (90 Base) MCG/ACT inhaler Inhale 2 puffs into the lungs every 6 hours as needed for Wheezing, Disp-1 Inhaler, R-3Print      furosemide (LASIX) 40 MG tablet Take 1 tablet by mouth daily. , Disp-30 tablet, R-5      ibuprofen (ADVIL;MOTRIN) 800 MG tablet Take 800 mg by mouth every 6 hours as needed. !! - Potential duplicate medications found.  Please discuss

## 2019-06-03 NOTE — ED NOTES
Pt resting in bed. VSS. Respirations easy and unlabored. Pt informed on plan of care. Pt denies any needs at this time. Will continue to monitor.       Abram Dixon RN  06/03/19 4740

## 2019-06-04 ENCOUNTER — TELEPHONE (OUTPATIENT)
Dept: INTERNAL MEDICINE CLINIC | Age: 39
End: 2019-06-04

## 2019-06-05 ENCOUNTER — OFFICE VISIT (OUTPATIENT)
Dept: INTERNAL MEDICINE CLINIC | Age: 39
End: 2019-06-05
Payer: MEDICARE

## 2019-06-05 VITALS
BODY MASS INDEX: 34.23 KG/M2 | HEART RATE: 90 BPM | WEIGHT: 213 LBS | SYSTOLIC BLOOD PRESSURE: 143 MMHG | DIASTOLIC BLOOD PRESSURE: 88 MMHG | HEIGHT: 66 IN

## 2019-06-05 DIAGNOSIS — G89.4 CHRONIC PAIN SYNDROME: ICD-10-CM

## 2019-06-05 DIAGNOSIS — F60.3 BORDERLINE PERSONALITY DISORDER (HCC): ICD-10-CM

## 2019-06-05 DIAGNOSIS — B37.9 YEAST INFECTION: ICD-10-CM

## 2019-06-05 DIAGNOSIS — Z51.81 ENCOUNTER FOR MONITORING SUBOXONE MAINTENANCE THERAPY: ICD-10-CM

## 2019-06-05 DIAGNOSIS — F41.9 ANXIETY: ICD-10-CM

## 2019-06-05 DIAGNOSIS — F11.20 SEVERE OPIOID USE DISORDER (HCC): Primary | ICD-10-CM

## 2019-06-05 DIAGNOSIS — Z79.899 ENCOUNTER FOR MONITORING SUBOXONE MAINTENANCE THERAPY: ICD-10-CM

## 2019-06-05 PROCEDURE — 80305 DRUG TEST PRSMV DIR OPT OBS: CPT | Performed by: INTERNAL MEDICINE

## 2019-06-05 PROCEDURE — 4004F PT TOBACCO SCREEN RCVD TLK: CPT | Performed by: INTERNAL MEDICINE

## 2019-06-05 PROCEDURE — G8417 CALC BMI ABV UP PARAM F/U: HCPCS | Performed by: INTERNAL MEDICINE

## 2019-06-05 PROCEDURE — G8427 DOCREV CUR MEDS BY ELIG CLIN: HCPCS | Performed by: INTERNAL MEDICINE

## 2019-06-05 PROCEDURE — 99213 OFFICE O/P EST LOW 20 MIN: CPT | Performed by: INTERNAL MEDICINE

## 2019-06-05 RX ORDER — FLUCONAZOLE 150 MG/1
150 TABLET ORAL ONCE
Qty: 1 TABLET | Refills: 0 | Status: SHIPPED | OUTPATIENT
Start: 2019-06-05 | End: 2019-06-05

## 2019-06-05 RX ORDER — BUPRENORPHINE AND NALOXONE 8; 2 MG/1; MG/1
1 FILM, SOLUBLE BUCCAL; SUBLINGUAL 2 TIMES DAILY
Qty: 28 FILM | Refills: 0 | Status: SHIPPED | OUTPATIENT
Start: 2019-06-05 | End: 2019-06-19 | Stop reason: SDUPTHER

## 2019-06-05 RX ORDER — BUPRENORPHINE AND NALOXONE 2; .5 MG/1; MG/1
1 FILM, SOLUBLE BUCCAL; SUBLINGUAL DAILY
Qty: 14 FILM | Refills: 0 | Status: SHIPPED | OUTPATIENT
Start: 2019-06-05 | End: 2019-06-19

## 2019-06-05 NOTE — PROGRESS NOTES
Verbal order per Dr. Sravan Stevens for urine drug screen. Positive for BUP. Verified results with Namrata Guevara. Dr. Sravan Stevens ordered Suboxone 8 mg film BID and 2 mg film daily for patient. Verified dose with patient. Patient was sent home with 2 week script for Suboxone 8 mg film BID and 2 week script for Suboxone 2 mg film daily and will be seen back in the office on 6/18/2019. Urine sent for BUP levels. Pt is to have Hep C bloodwork drawn. Script for Diflucan 150 mg #1 sent to [facility].

## 2019-06-05 NOTE — PROGRESS NOTES
[facility] PROFESSIONAL SERVS  PHYSICIANS [place] [address] [address] ÁLVARO RUIZ AM OFFPATRICK BASS.PADDY, Dimitrios Brown  Dept: [phone]  Dept Fax: [phone]      Chief Complaint   Patient presents with    Drug Problem   yeast    Patient presents for evaluation of opioid use. I saw her 5/22. This patient is followed regularly by  Georges Brown CNP. Patient said she is followed for the last 6 months at the [address] [place] [address] clinic by Georges Brown. She gives her Suboxone as well as her psych medications. She says she wants to come here because she was told she had to take the Suboxone shot. She had no choice. She started using pain pills at age 23. She was taking up to 10 Percocets daily. History of cocaine, none recently. She's been on Suboxone several times, but relapsed. Most recently after an inpatient stay    . She says she had developed an abscess under her right arm. She took clindamycin abscess better  She says she has a history of these. .  She complains of a vaginal discharge and vaginal itching    Past Medical History:   Diagnosis Date    [facility]chronic obstructive [facility] disease) ([facility]     Diabetes mellitus ([facility]     Drug addiction ([facility]     HPV (human papilloma virus) infection     Hypertension        Current Outpatient Medications   Medication Sig Dispense Refill    buprenorphine-naloxone (SUBOXONE) 8-2 MG FILM SL film Place 1 Film under the tongue 2 times daily for 14 days. 28 Film 0    buprenorphine-naloxone (SUBOXONE) 2-0.5 MG FILM SL film Place 1 Film under the tongue daily for 14 days. 14 Film 0    gabapentin (NEURONTIN) 800 MG tablet Take 1 tablet by mouth 3 times daily for 30 days.  90 tablet 0    QUEtiapine (SEROQUEL) 50 MG tablet Take 1 tablet by mouth nightly 30 tablet 1    venlafaxine (EFFEXOR) 75 MG tablet Take 225 mg by mouth daily      QUEtiapine (SEROQUEL XR) 200 MG extended release tablet Take 1 tablet by mouth nightly 30 tablet 3    QUEtiapine (SEROQUEL) 25 MG tablet Take 4 tablets by mouth daily 120 tablet 2    promethazine (PHENERGAN) 25 MG tablet Take 25 mg by mouth every 6 hours as needed for Nausea      insulin lispro (HUMALOG) 100 UNIT/ML injection vial Inject into the skin 3 times daily (before meals) sliding scale      albuterol sulfate HFA (PROVENTIL HFA) 108 (90 Base) MCG/ACT inhaler Inhale 2 puffs into the lungs every 6 hours as needed for Wheezing 1 Inhaler 3    furosemide (LASIX) 40 MG tablet Take 1 tablet by mouth daily. 30 tablet 5    ibuprofen (ADVIL;MOTRIN) 800 MG tablet Take 800 mg by mouth every 6 hours as needed. No current facility-administered medications for this visit. Review of Systems - . She says she had some urges, but she thinks is from her anxiety    Blood pressure (!) 143/88, pulse 90, height 5' 6\" (1.676 m), weight 213 lb (96.6 kg), not currently breastfeeding. Physical Examination: General appearance - alert, well appearing, and in no distress  HEENT-head normocephalic, atraumatic  Mental status - alert, oriented to person, place, and time    Urine tox screen today.   Amphetamine Screen, Urine 06/05/2019  9:06 AM Unknown   NEG    Barbiturate Screen, Urine 06/05/2019  9:06 AM Unknown   NEG    Benzodiazepine Screen, Urine 06/05/2019  9:06 AM Unknown   NEG    Buprenorphine Urine 06/05/2019  9:06 AM Unknown   POS    Cocaine Metabolite Screen, Urine 06/05/2019  9:06 AM Unknown   NEG    Gabapentin Screen, Urine 06/05/2019  9:06 AM Unknown   N/A    MDMA, Urine 06/05/2019  9:06 AM Unknown   NEG    Methamphetamine, Urine 06/05/2019  9:06 AM Unknown   NEG    Methadone Screen, Urine 06/05/2019  9:06 AM Unknown   NEG    Opiate Scrn, Ur 06/05/2019  9:06 AM Unknown   NEG    Oxycodone Screen, Ur 06/05/2019  9:06 AM Unknown   NEG    PCP Screen, Urine 06/05/2019  9:06 AM Unknown   NEG    Propoxyphene Screen, Urine 06/05/2019  9:06 AM Unknown   N/A    THC Screen, Urine 06/05/2019  9:06 AM Unknown   NEG    Tricyclic Antidepressants, Urine 06/05/2019  9:06 AM Unknown   N/A         Diagnosis Orders   1. Severe opioid use disorder (HCC)  POCT Rapid Drug Screen    buprenorphine-naloxone (SUBOXONE) 8-2 MG FILM SL film    buprenorphine-naloxone (SUBOXONE) 2-0.5 MG FILM SL film    Miscellaneous Sendout 1    Hepatitis C Antibody   2. Borderline personality disorder ([facility]     3. Chronic pain syndrome     4. Encounter for monitoring Suboxone maintenance therapy     5. Anxiety     6. Yeast infection         Orders Placed This Encounter   Procedures    Miscellaneous Sendout 1     Standing Status:   Future     Standing Expiration Date:   6/5/2020     Order Specific Question:   Specify Req. Test (1 Test/Order)     Answer:   Sarahi Rudolph [id number]    Hepatitis C Antibody     Standing Status:   Future     Standing Expiration Date:   6/5/2020    POCT Rapid Drug Screen     I told the patient here she is not forced to take the shot. It is available if she would like to pursue it. She is on 8 mg twice daily. I added 2 mg a week ago  She has a history of psychiatric issues including bipolar. .  She also has chronic pain syndrome and is on Neurontin  I  set her up to see Dr. Sheldon Morelos  .   I will give her Diflucan 150 mg ×1 dose for yeast infection  Check hep C, urine buprenorphine levels   follow-up 2 weeks

## 2019-06-09 LAB — MISC. #1 REFERENCE GROUP TEST: NORMAL

## 2019-06-19 ENCOUNTER — OFFICE VISIT (OUTPATIENT)
Dept: INTERNAL MEDICINE CLINIC | Age: 39
End: 2019-06-19
Payer: MEDICARE

## 2019-06-19 VITALS
DIASTOLIC BLOOD PRESSURE: 92 MMHG | SYSTOLIC BLOOD PRESSURE: 139 MMHG | BODY MASS INDEX: 34.72 KG/M2 | HEART RATE: 95 BPM | HEIGHT: 66 IN | WEIGHT: 216 LBS

## 2019-06-19 DIAGNOSIS — Z51.81 ENCOUNTER FOR MONITORING SUBOXONE MAINTENANCE THERAPY: ICD-10-CM

## 2019-06-19 DIAGNOSIS — F11.20 SEVERE OPIOID USE DISORDER (HCC): Primary | ICD-10-CM

## 2019-06-19 DIAGNOSIS — F60.3 BORDERLINE PERSONALITY DISORDER (HCC): ICD-10-CM

## 2019-06-19 DIAGNOSIS — F41.9 ANXIETY: ICD-10-CM

## 2019-06-19 DIAGNOSIS — G89.4 CHRONIC PAIN SYNDROME: ICD-10-CM

## 2019-06-19 DIAGNOSIS — Z79.899 ENCOUNTER FOR MONITORING SUBOXONE MAINTENANCE THERAPY: ICD-10-CM

## 2019-06-19 PROCEDURE — 4004F PT TOBACCO SCREEN RCVD TLK: CPT | Performed by: INTERNAL MEDICINE

## 2019-06-19 PROCEDURE — 80305 DRUG TEST PRSMV DIR OPT OBS: CPT | Performed by: INTERNAL MEDICINE

## 2019-06-19 PROCEDURE — G8427 DOCREV CUR MEDS BY ELIG CLIN: HCPCS | Performed by: INTERNAL MEDICINE

## 2019-06-19 PROCEDURE — G8417 CALC BMI ABV UP PARAM F/U: HCPCS | Performed by: INTERNAL MEDICINE

## 2019-06-19 PROCEDURE — 99213 OFFICE O/P EST LOW 20 MIN: CPT | Performed by: INTERNAL MEDICINE

## 2019-06-19 RX ORDER — BUPRENORPHINE AND NALOXONE 8; 2 MG/1; MG/1
1 FILM, SOLUBLE BUCCAL; SUBLINGUAL 2 TIMES DAILY
Qty: 28 FILM | Refills: 0 | Status: SHIPPED | OUTPATIENT
Start: 2019-06-19 | End: 2019-07-03 | Stop reason: SDUPTHER

## 2019-06-19 RX ORDER — BUPRENORPHINE AND NALOXONE 2; .5 MG/1; MG/1
1 FILM, SOLUBLE BUCCAL; SUBLINGUAL DAILY
COMMUNITY
End: 2019-07-03 | Stop reason: SDUPTHER

## 2019-06-19 RX ORDER — GABAPENTIN 800 MG/1
800 TABLET ORAL 3 TIMES DAILY
Qty: 90 TABLET | Refills: 0 | Status: SHIPPED | OUTPATIENT
Start: 2019-06-19 | End: 2019-07-24 | Stop reason: SDUPTHER

## 2019-06-19 RX ORDER — BUPRENORPHINE AND NALOXONE 2; .5 MG/1; MG/1
1 FILM, SOLUBLE BUCCAL; SUBLINGUAL DAILY
Qty: 14 FILM | Refills: 0 | Status: SHIPPED | OUTPATIENT
Start: 2019-06-19 | End: 2019-07-03 | Stop reason: SDUPTHER

## 2019-06-19 NOTE — PROGRESS NOTES
[facility] PROFESSIONAL SERVS  PHYSICIANS [place] [address] [address]  [address] [facility]  Dept: [phone]  Dept Fax: [phone]      Chief Complaint   Patient presents with    Drug Problem       Patient presents for evaluation of opioid use. I saw her 6/5. This patient is followed regularly by  Melody Chapman CNP. Patient said she is followed for the last 6 months at the [address] [place] [address] clinic by Melody Chapman. She gives her Suboxone as well as her psych medications. She says she wants to come here because she was told she had to take the Suboxone shot. She had no choice. She started using pain pills at age 23. She was taking up to 10 Percocets daily. History of cocaine, none recently. She's been on Suboxone several times, but relapsed. Most recently after an inpatient stay    . She says she had developed an abscess under her right arm. She took clindamycin abscess better  She says she has a history of these. Past Medical History:   Diagnosis Date    COPD (chronic obstructive pulmonary disease) ([facility]     Diabetes mellitus ([facility]     Drug addiction ([facility]     HPV (human papilloma virus) infection     Hypertension        Current Outpatient Medications   Medication Sig Dispense Refill    buprenorphine-naloxone (SUBOXONE) 2-0.5 MG FILM SL film Place 1 Film under the tongue daily.  buprenorphine-naloxone (SUBOXONE) 2-0.5 MG FILM SL film Place 1 Film under the tongue daily for 14 days. 14 Film 0    buprenorphine-naloxone (SUBOXONE) 8-2 MG FILM SL film Place 1 Film under the tongue 2 times daily for 14 days. 28 Film 0    gabapentin (NEURONTIN) 800 MG tablet Take 1 tablet by mouth 3 times daily for 30 days.  90 tablet 0    QUEtiapine (SEROQUEL) 50 MG tablet Take 1 tablet by mouth nightly 30 tablet 1    venlafaxine (EFFEXOR) 75 MG tablet Take 225 mg by mouth daily      QUEtiapine (SEROQUEL XR) 200 MG extended release tablet Take 1 tablet by mouth nightly 30 tablet 3    QUEtiapine (SEROQUEL) 25 MG tablet Take 4 tablets by mouth daily 120 tablet 2    promethazine (PHENERGAN) 25 MG tablet Take 25 mg by mouth every 6 hours as needed for Nausea      insulin lispro (HUMALOG) 100 UNIT/ML injection vial Inject into the skin 3 times daily (before meals) sliding scale      albuterol sulfate HFA (PROVENTIL HFA) 108 (90 Base) MCG/ACT inhaler Inhale 2 puffs into the lungs every 6 hours as needed for Wheezing 1 Inhaler 3    furosemide (LASIX) 40 MG tablet Take 1 tablet by mouth daily. 30 tablet 5    ibuprofen (ADVIL;MOTRIN) 800 MG tablet Take 800 mg by mouth every 6 hours as needed. No current facility-administered medications for this visit. Review of Systems - . She says she had some urges, but she thinks is from her anxiety    Blood pressure (!) 139/92, pulse 95, height 5' 6\" (1.676 m), weight 216 lb (98 kg), not currently breastfeeding. Physical Examination: General appearance - alert, well appearing, and in no distress  HEENT-head normocephalic, atraumatic  Mental status - alert, oriented to person, place, and time    Urine tox screen today.   Amphetamine Screen, Urine 06/19/2019  8:43 AM Unknown   NEG    Barbiturate Screen, Urine 06/19/2019  8:43 AM Unknown   NEG    Benzodiazepine Screen, Urine 06/19/2019  8:43 AM Unknown   NEG    Buprenorphine Urine 06/19/2019  8:43 AM Unknown   POS    Cocaine Metabolite Screen, Urine 06/19/2019  8:43 AM Unknown   NEG    Gabapentin Screen, Urine 06/19/2019  8:43 AM Unknown   N/A    MDMA, Urine 06/19/2019  8:43 AM Unknown   NEG    Methamphetamine, Urine 06/19/2019  8:43 AM Unknown   NEG    Methadone Screen, Urine 06/19/2019  8:43 AM Unknown   NEG    Opiate Scrn, Ur 06/19/2019  8:43 AM Unknown   NEG    Oxycodone Screen, Ur 06/19/2019  8:43 AM Unknown   NEG    PCP Screen, Urine 06/19/2019  8:43 AM Unknown   NEG    Propoxyphene Screen, Urine 06/19/2019  8:43 AM Unknown   N/A    THC Screen, Urine 06/19/2019  8:43 AM Unknown   NEG    Tricyclic Antidepressants, Urine 06/19/2019  8:43 AM Unknown   N/A         Diagnosis Orders   1. Severe opioid use disorder (HCC)  POCT Rapid Drug Screen    buprenorphine-naloxone (SUBOXONE) 2-0.5 MG FILM SL film    buprenorphine-naloxone (SUBOXONE) 8-2 MG FILM SL film    gabapentin (NEURONTIN) 800 MG tablet   2. Borderline personality disorder ([facility]     3. Chronic pain syndrome     4. Encounter for monitoring Suboxone maintenance therapy     5. Anxiety         Orders Placed This Encounter   Procedures    POCT Rapid Drug Screen     I told the patient here she is not forced to take the shot. It is available if she would like to pursue it. She is on 8 mg twice daily. I added 2 mg a week ago  She has a history of psychiatric issues including bipolar.   .  She also has chronic pain syndrome and is on Neurontin  I  set her up to see Dr. Katelyn ROBERT, urine buprenorphine levels appropriate last visit  I did refill her Neurontin   follow-up 2 weeks

## 2019-06-19 NOTE — PROGRESS NOTES
Verbal order per Dr Nahed Almanza for urine drug screen. Positive for BUP. Verified results with Ginny Almanza ordered patient Suboxone 8 mg Flim BID and 2 mg Film in the afternoon daily . Verified with patient correct dose. Patient was sent home with Suboxone 8 mg Film BID # 28 with 0 refills, Suboxone 2 mg Film daily in the afternoon #14 with 0 refills and will be seen back in office on 7/3/19.

## 2019-07-03 ENCOUNTER — OFFICE VISIT (OUTPATIENT)
Dept: INTERNAL MEDICINE CLINIC | Age: 39
End: 2019-07-03
Payer: MEDICARE

## 2019-07-03 VITALS
HEART RATE: 92 BPM | SYSTOLIC BLOOD PRESSURE: 141 MMHG | HEIGHT: 66 IN | BODY MASS INDEX: 35.12 KG/M2 | DIASTOLIC BLOOD PRESSURE: 88 MMHG | WEIGHT: 218.5 LBS

## 2019-07-03 DIAGNOSIS — Z79.899 ENCOUNTER FOR MONITORING SUBOXONE MAINTENANCE THERAPY: ICD-10-CM

## 2019-07-03 DIAGNOSIS — F11.20 SEVERE OPIOID USE DISORDER (HCC): Primary | ICD-10-CM

## 2019-07-03 DIAGNOSIS — G89.4 CHRONIC PAIN SYNDROME: ICD-10-CM

## 2019-07-03 DIAGNOSIS — Z51.81 ENCOUNTER FOR MONITORING SUBOXONE MAINTENANCE THERAPY: ICD-10-CM

## 2019-07-03 DIAGNOSIS — F60.3 BORDERLINE PERSONALITY DISORDER (HCC): ICD-10-CM

## 2019-07-03 DIAGNOSIS — L02.91 ABSCESS: ICD-10-CM

## 2019-07-03 PROCEDURE — G8417 CALC BMI ABV UP PARAM F/U: HCPCS | Performed by: INTERNAL MEDICINE

## 2019-07-03 PROCEDURE — G8427 DOCREV CUR MEDS BY ELIG CLIN: HCPCS | Performed by: INTERNAL MEDICINE

## 2019-07-03 PROCEDURE — 80305 DRUG TEST PRSMV DIR OPT OBS: CPT | Performed by: INTERNAL MEDICINE

## 2019-07-03 PROCEDURE — 99213 OFFICE O/P EST LOW 20 MIN: CPT | Performed by: INTERNAL MEDICINE

## 2019-07-03 PROCEDURE — 4004F PT TOBACCO SCREEN RCVD TLK: CPT | Performed by: INTERNAL MEDICINE

## 2019-07-03 RX ORDER — BUPRENORPHINE AND NALOXONE 2; .5 MG/1; MG/1
1 FILM, SOLUBLE BUCCAL; SUBLINGUAL DAILY
Qty: 21 FILM | Refills: 0 | Status: SHIPPED | OUTPATIENT
Start: 2019-07-03 | End: 2019-07-24 | Stop reason: SDUPTHER

## 2019-07-03 RX ORDER — BUPRENORPHINE AND NALOXONE 8; 2 MG/1; MG/1
1 FILM, SOLUBLE BUCCAL; SUBLINGUAL 2 TIMES DAILY
Qty: 42 FILM | Refills: 0 | Status: SHIPPED | OUTPATIENT
Start: 2019-07-03 | End: 2019-07-24 | Stop reason: SDUPTHER

## 2019-07-03 NOTE — PROGRESS NOTES
Verbal order per Dr Amari Sales for urine drug screen. Positive for BUP. Verified results with Paulina CHEN. Patient sent home with script for Suboxone 2 mg bonifacio daily and Suboxone 8 mg film twice daily for 3 weeks and be seen back on 7/24/19.

## 2019-07-06 LAB
BUPRENORPHINE GLUCURONIDE URINE: 43 NG/ML
BUPRENORPHINE URINE: < 2 NG/ML
NALOXONE URINE: < 100 NG/ML
NORBUPRENORPHINE GLUCURONIDE URINE: 85 NG/ML
NORBUPRENORPHINE, URINE: POSITIVE NG/ML

## 2019-07-24 ENCOUNTER — OFFICE VISIT (OUTPATIENT)
Dept: INTERNAL MEDICINE CLINIC | Age: 39
End: 2019-07-24
Payer: MEDICARE

## 2019-07-24 VITALS
SYSTOLIC BLOOD PRESSURE: 128 MMHG | HEIGHT: 66 IN | DIASTOLIC BLOOD PRESSURE: 73 MMHG | BODY MASS INDEX: 34.28 KG/M2 | HEART RATE: 74 BPM | WEIGHT: 213.31 LBS

## 2019-07-24 VITALS
DIASTOLIC BLOOD PRESSURE: 70 MMHG | HEART RATE: 92 BPM | HEIGHT: 66 IN | SYSTOLIC BLOOD PRESSURE: 110 MMHG | WEIGHT: 214 LBS | OXYGEN SATURATION: 96 % | BODY MASS INDEX: 34.39 KG/M2

## 2019-07-24 DIAGNOSIS — F31.9 BIPOLAR 1 DISORDER (HCC): ICD-10-CM

## 2019-07-24 DIAGNOSIS — Z79.4 TYPE 2 DIABETES MELLITUS WITH HYPERGLYCEMIA, WITH LONG-TERM CURRENT USE OF INSULIN (HCC): Primary | ICD-10-CM

## 2019-07-24 DIAGNOSIS — Z79.899 ENCOUNTER FOR MONITORING SUBOXONE MAINTENANCE THERAPY: ICD-10-CM

## 2019-07-24 DIAGNOSIS — G89.4 CHRONIC PAIN SYNDROME: ICD-10-CM

## 2019-07-24 DIAGNOSIS — E11.65 TYPE 2 DIABETES MELLITUS WITH HYPERGLYCEMIA, WITH LONG-TERM CURRENT USE OF INSULIN (HCC): Primary | ICD-10-CM

## 2019-07-24 DIAGNOSIS — J44.1 COPD EXACERBATION (HCC): ICD-10-CM

## 2019-07-24 DIAGNOSIS — F11.20 SEVERE OPIOID USE DISORDER (HCC): Primary | ICD-10-CM

## 2019-07-24 DIAGNOSIS — M79.89 LEG SWELLING: ICD-10-CM

## 2019-07-24 DIAGNOSIS — Z51.81 ENCOUNTER FOR MONITORING SUBOXONE MAINTENANCE THERAPY: ICD-10-CM

## 2019-07-24 DIAGNOSIS — R06.02 SHORTNESS OF BREATH: ICD-10-CM

## 2019-07-24 LAB
AMPHETAMINE SCREEN, URINE: ABNORMAL
BARBITURATE SCREEN, URINE: ABNORMAL
BENZODIAZEPINE SCREEN, URINE: ABNORMAL
BUPRENORPHINE URINE: ABNORMAL
COCAINE METABOLITE SCREEN URINE: ABNORMAL
GABAPENTIN SCREEN, URINE: ABNORMAL
HBA1C MFR BLD: 13.9 % (ref 4.3–5.7)
MDMA URINE: ABNORMAL
METHADONE SCREEN, URINE: ABNORMAL
METHAMPHETAMINE, URINE: ABNORMAL
OPIATE SCREEN URINE: ABNORMAL
OXYCODONE SCREEN URINE: ABNORMAL
PHENCYCLIDINE SCREEN URINE: ABNORMAL
PROPOXYPHENE SCREEN, URINE: ABNORMAL
THC SCREEN, URINE: ABNORMAL
TRICYCLIC ANTIDEPRESSANTS, UR: ABNORMAL

## 2019-07-24 PROCEDURE — 2022F DILAT RTA XM EVC RTNOPTHY: CPT | Performed by: NURSE PRACTITIONER

## 2019-07-24 PROCEDURE — 99213 OFFICE O/P EST LOW 20 MIN: CPT | Performed by: INTERNAL MEDICINE

## 2019-07-24 PROCEDURE — G8417 CALC BMI ABV UP PARAM F/U: HCPCS | Performed by: INTERNAL MEDICINE

## 2019-07-24 PROCEDURE — 4004F PT TOBACCO SCREEN RCVD TLK: CPT | Performed by: NURSE PRACTITIONER

## 2019-07-24 PROCEDURE — G8926 SPIRO NO PERF OR DOC: HCPCS | Performed by: NURSE PRACTITIONER

## 2019-07-24 PROCEDURE — G8428 CUR MEDS NOT DOCUMENT: HCPCS | Performed by: INTERNAL MEDICINE

## 2019-07-24 PROCEDURE — G8417 CALC BMI ABV UP PARAM F/U: HCPCS | Performed by: NURSE PRACTITIONER

## 2019-07-24 PROCEDURE — 80305 DRUG TEST PRSMV DIR OPT OBS: CPT | Performed by: INTERNAL MEDICINE

## 2019-07-24 PROCEDURE — 83036 HEMOGLOBIN GLYCOSYLATED A1C: CPT | Performed by: NURSE PRACTITIONER

## 2019-07-24 PROCEDURE — 99204 OFFICE O/P NEW MOD 45 MIN: CPT | Performed by: NURSE PRACTITIONER

## 2019-07-24 PROCEDURE — 3023F SPIROM DOC REV: CPT | Performed by: NURSE PRACTITIONER

## 2019-07-24 PROCEDURE — 4004F PT TOBACCO SCREEN RCVD TLK: CPT | Performed by: INTERNAL MEDICINE

## 2019-07-24 PROCEDURE — 3046F HEMOGLOBIN A1C LEVEL >9.0%: CPT | Performed by: NURSE PRACTITIONER

## 2019-07-24 PROCEDURE — G8427 DOCREV CUR MEDS BY ELIG CLIN: HCPCS | Performed by: NURSE PRACTITIONER

## 2019-07-24 RX ORDER — BUPRENORPHINE AND NALOXONE 2; .5 MG/1; MG/1
1 FILM, SOLUBLE BUCCAL; SUBLINGUAL DAILY
Qty: 21 FILM | Refills: 0 | Status: SHIPPED | OUTPATIENT
Start: 2019-07-24 | End: 2019-08-14 | Stop reason: SDUPTHER

## 2019-07-24 RX ORDER — GABAPENTIN 800 MG/1
800 TABLET ORAL 3 TIMES DAILY
Qty: 90 TABLET | Refills: 0 | Status: SHIPPED | OUTPATIENT
Start: 2019-07-24 | End: 2019-08-23 | Stop reason: SDUPTHER

## 2019-07-24 RX ORDER — GLIMEPIRIDE 1 MG/1
1 TABLET ORAL
Qty: 30 TABLET | Refills: 1 | Status: SHIPPED | OUTPATIENT
Start: 2019-07-24 | End: 2019-09-04 | Stop reason: SDUPTHER

## 2019-07-24 RX ORDER — BUPRENORPHINE AND NALOXONE 8; 2 MG/1; MG/1
1 FILM, SOLUBLE BUCCAL; SUBLINGUAL 2 TIMES DAILY
Qty: 42 FILM | Refills: 0 | Status: SHIPPED | OUTPATIENT
Start: 2019-07-24 | End: 2019-08-14 | Stop reason: SDUPTHER

## 2019-07-24 RX ORDER — NICOTINE 21 MG/24HR
1 PATCH, TRANSDERMAL 24 HOURS TRANSDERMAL DAILY
Qty: 42 PATCH | Refills: 0 | Status: SHIPPED | OUTPATIENT
Start: 2019-07-24 | End: 2019-09-04 | Stop reason: ALTCHOICE

## 2019-07-24 RX ORDER — PROMETHAZINE HYDROCHLORIDE 25 MG/1
25 TABLET ORAL EVERY 6 HOURS PRN
Qty: 15 TABLET | Refills: 0 | Status: SHIPPED | OUTPATIENT
Start: 2019-07-24 | End: 2019-10-16 | Stop reason: SDUPTHER

## 2019-07-24 ASSESSMENT — ENCOUNTER SYMPTOMS
WHEEZING: 0
CONSTIPATION: 0
SINUS PAIN: 0
ABDOMINAL DISTENTION: 0
PHOTOPHOBIA: 0
ABDOMINAL PAIN: 0
COUGH: 0
DIARRHEA: 0
SINUS PRESSURE: 0
NAUSEA: 0
SORE THROAT: 0
TROUBLE SWALLOWING: 0
VOMITING: 0
BLOOD IN STOOL: 0
SHORTNESS OF BREATH: 1

## 2019-07-24 NOTE — PROGRESS NOTES
[address] INTERNAL MEDICINE  [address]  [place] [address]  Dept: [phone]  Dept Fax: [phone]  Loc: [phone]     Visit Date:  7/24/2019    Patient:  Lisa Ponce  YOB: 1980    HPI:     Chief Complaint   Patient presents with   Miki Hyde    Diabetes     Chastity presents today for medical evaluation of diabetes, COPD and to establish care with PCP. She follows routinely with Dr. Margarette Retana for MAT. COPD-   States that she was diagnosed with COPD 2 years ago. She has an Albuterol inhaler PRN, that she uses often. Does not follow with pulmonary routinely. She is a current, everyday smoker. Took chantix but she experienced nausea and vomiting. She has smoked 1 pack per day since she was 17years old. PFTs in 2012, unable to view results. Will need updated. Is interested in smoking cessation. Will try Nicoderm patches. chest xray 7/3/18  Impression   No acute findings. Diabetes-  Diagnosed when she was in her 25s. She states her diabetes is not well controlled. A1C 13.9% today in office. Is on Humalog 15 units TID with meals plus a sliding scale, and long acting (unsure of which drug) 40 units BID. Metformin gave diarrhea. Last urinary microalbumin/crt ratio unknown. States hypoglycemic events not present, but has experienced hypoglycemic in the past with symptoms of shaky and foggy. Reports checking glucose 3 times per day, with range of glucose readings around 200. She did not bring her meter today for download. She is not attempting dietary modification for diabetes management and/or weight loss. Has not followed with diabetes clinic, dietician, CDE. Will refer once life stressors are improved. Does not report difficulty with obtaining medications. Last eye exam recently. BP reccs <140/90 (<130/80 in CVD risk). Denies polyuria, polydipsia, polyphagia.  Denies neuropathic symptoms including numbness, tingling but does have a lot of pain. Does not have any wounds to feet. Current Outpatient Medications:     nicotine (NICODERM CQ) 21 MG/24HR, Place 1 patch onto the skin daily (Patient not taking: Reported on 8/14/2019), Disp: 42 patch, Rfl: 0    glimepiride (AMARYL) 1 MG tablet, Take 1 tablet by mouth every morning (before breakfast), Disp: 30 tablet, Rfl: 1    promethazine (PHENERGAN) 25 MG tablet, Take 1 tablet by mouth every 6 hours as needed for Nausea, Disp: 15 tablet, Rfl: 0    QUEtiapine (SEROQUEL) 50 MG tablet, Take 1 tablet by mouth nightly, Disp: 30 tablet, Rfl: 1    venlafaxine (EFFEXOR) 75 MG tablet, Take 225 mg by mouth daily, Disp: , Rfl:     QUEtiapine (SEROQUEL XR) 200 MG extended release tablet, Take 1 tablet by mouth nightly, Disp: 30 tablet, Rfl: 3    QUEtiapine (SEROQUEL) 25 MG tablet, Take 4 tablets by mouth daily, Disp: 120 tablet, Rfl: 2    insulin lispro (HUMALOG) 100 UNIT/ML injection vial, Inject 15 Units into the skin 3 times daily (before meals) Plus sliding scale, Disp: , Rfl:     albuterol sulfate HFA (PROVENTIL HFA) 108 (90 Base) MCG/ACT inhaler, Inhale 2 puffs into the lungs every 6 hours as needed for Wheezing, Disp: 1 Inhaler, Rfl: 3    ibuprofen (ADVIL;MOTRIN) 800 MG tablet, Take 800 mg by mouth every 6 hours as needed. , Disp: , Rfl:     buprenorphine-naloxone (SUBOXONE) 2-0.5 MG FILM SL film, Place 1 Film under the tongue daily for 21 days. , Disp: 21 Film, Rfl: 0    buprenorphine-naloxone (SUBOXONE) 8-2 MG FILM SL film, Place 1 Film under the tongue 2 times daily for 21 days. , Disp: 42 Film, Rfl: 0    gabapentin (NEURONTIN) 800 MG tablet, Take 1 tablet by mouth 3 times daily for 127 days. , Disp: 90 tablet, Rfl: 0    The patient is allergic to ketorolac tromethamine; morphine; and darvocet [propoxyphene n-acetaminophen].     Past Medical History  Chastity  has a past medical history of COPD (chronic obstructive pulmonary disease) ([facility] [address] Diabetes and palpitations. Gastrointestinal: Negative for abdominal distention, abdominal pain, blood in stool, constipation, diarrhea, nausea and vomiting. Endocrine: Negative for polydipsia, polyphagia and polyuria. Genitourinary: Negative for difficulty urinating, dysuria and hematuria. Musculoskeletal: Negative for arthralgias and myalgias. Skin: Negative for rash and wound. Neurological: Negative for dizziness, light-headedness, numbness and headaches. Psychiatric/Behavioral: Negative for dysphoric mood and sleep disturbance. The patient is not nervous/anxious. Objective:     /70 (Site: Right Upper Arm, Position: Sitting, Cuff Size: Large Adult)   Pulse 92   Ht 5' 6\" (1.676 m)   Wt 214 lb (97.1 kg)   SpO2 96%   BMI 34.54 kg/m²     Physical Exam   Constitutional: She is oriented to person, place, and time. She appears well-developed and well-nourished. No distress. HENT:   Head: Normocephalic and atraumatic. Right Ear: External ear normal.   Left Ear: External ear normal.   Nose: Nose normal.   Mouth/Throat: Oropharynx is clear and moist.   Eyes: Pupils are equal, round, and reactive to light. Conjunctivae and EOM are normal.   Neck: Normal range of motion. Neck supple. No JVD present. No thyromegaly present. Cardiovascular: Normal rate, regular rhythm and normal heart sounds. Exam reveals no gallop and no friction rub. No murmur heard. Pulmonary/Chest: Effort normal. No respiratory distress. She has no wheezes. She has no rales. Abdominal: Soft. Bowel sounds are normal. She exhibits no distension. There is no tenderness. Musculoskeletal: Normal range of motion. She exhibits edema (trace BLE). Tenderness: 1+ left belén, trace right. Lymphadenopathy:     She has no cervical adenopathy. Neurological: She is alert and oriented to person, place, and time. Skin: Skin is warm and dry. Capillary refill takes less than 2 seconds. No erythema.    Psychiatric: She has a normal mood

## 2019-07-28 LAB
BUPRENORPHINE GLUCURONIDE URINE: 80 NG/ML
BUPRENORPHINE URINE: 3 NG/ML
NALOXONE URINE: < 100 NG/ML
NORBUPRENORPHINE GLUCURONIDE URINE: 112 NG/ML
NORBUPRENORPHINE, URINE: NORMAL NG/ML

## 2019-08-14 ENCOUNTER — OFFICE VISIT (OUTPATIENT)
Dept: INTERNAL MEDICINE CLINIC | Age: 39
End: 2019-08-14
Payer: MEDICARE

## 2019-08-14 VITALS
BODY MASS INDEX: 34.47 KG/M2 | DIASTOLIC BLOOD PRESSURE: 79 MMHG | HEIGHT: 66 IN | HEART RATE: 89 BPM | WEIGHT: 214.5 LBS | SYSTOLIC BLOOD PRESSURE: 133 MMHG

## 2019-08-14 DIAGNOSIS — E11.65 TYPE 2 DIABETES MELLITUS WITH HYPERGLYCEMIA, WITH LONG-TERM CURRENT USE OF INSULIN (HCC): ICD-10-CM

## 2019-08-14 DIAGNOSIS — F31.9 BIPOLAR 1 DISORDER (HCC): ICD-10-CM

## 2019-08-14 DIAGNOSIS — Z51.81 ENCOUNTER FOR MONITORING SUBOXONE MAINTENANCE THERAPY: ICD-10-CM

## 2019-08-14 DIAGNOSIS — G89.4 CHRONIC PAIN SYNDROME: ICD-10-CM

## 2019-08-14 DIAGNOSIS — Z79.4 TYPE 2 DIABETES MELLITUS WITH HYPERGLYCEMIA, WITH LONG-TERM CURRENT USE OF INSULIN (HCC): ICD-10-CM

## 2019-08-14 DIAGNOSIS — F11.20 SEVERE OPIOID USE DISORDER (HCC): Primary | ICD-10-CM

## 2019-08-14 DIAGNOSIS — Z79.899 ENCOUNTER FOR MONITORING SUBOXONE MAINTENANCE THERAPY: ICD-10-CM

## 2019-08-14 PROCEDURE — 99213 OFFICE O/P EST LOW 20 MIN: CPT | Performed by: INTERNAL MEDICINE

## 2019-08-14 PROCEDURE — 2022F DILAT RTA XM EVC RTNOPTHY: CPT | Performed by: INTERNAL MEDICINE

## 2019-08-14 PROCEDURE — G8427 DOCREV CUR MEDS BY ELIG CLIN: HCPCS | Performed by: INTERNAL MEDICINE

## 2019-08-14 PROCEDURE — 80305 DRUG TEST PRSMV DIR OPT OBS: CPT | Performed by: INTERNAL MEDICINE

## 2019-08-14 PROCEDURE — 3046F HEMOGLOBIN A1C LEVEL >9.0%: CPT | Performed by: INTERNAL MEDICINE

## 2019-08-14 PROCEDURE — G8417 CALC BMI ABV UP PARAM F/U: HCPCS | Performed by: INTERNAL MEDICINE

## 2019-08-14 PROCEDURE — 4004F PT TOBACCO SCREEN RCVD TLK: CPT | Performed by: INTERNAL MEDICINE

## 2019-08-14 RX ORDER — BUPRENORPHINE AND NALOXONE 8; 2 MG/1; MG/1
1 FILM, SOLUBLE BUCCAL; SUBLINGUAL 2 TIMES DAILY
Qty: 42 FILM | Refills: 0 | Status: SHIPPED | OUTPATIENT
Start: 2019-08-14 | End: 2019-09-04 | Stop reason: SDUPTHER

## 2019-08-14 RX ORDER — BUPRENORPHINE AND NALOXONE 2; .5 MG/1; MG/1
1 FILM, SOLUBLE BUCCAL; SUBLINGUAL DAILY
Qty: 21 FILM | Refills: 0 | Status: SHIPPED | OUTPATIENT
Start: 2019-08-14 | End: 2019-09-04 | Stop reason: SDUPTHER

## 2019-08-14 NOTE — PROGRESS NOTES
Verbal order per Dr. Brent Nash for urine drug screen. Positive for BUP. Verified results with Doris Muniz. Dr. Brent Nash ordered suboxone 8mg film sl bid and 2mg film daily for patient. Verified dose with patient. Patient was sent home with 3 week script for suboxone 8mg film sl bid and 2mg film sl daily and will be seen back in the office on 9/4/19.

## 2019-08-20 RX ORDER — PROMETHAZINE HYDROCHLORIDE 25 MG/1
25 TABLET ORAL EVERY 6 HOURS PRN
Qty: 15 TABLET | Refills: 0 | OUTPATIENT
Start: 2019-08-20

## 2019-08-20 RX ORDER — QUETIAPINE FUMARATE 25 MG/1
100 TABLET, FILM COATED ORAL DAILY
Qty: 120 TABLET | Refills: 2 | OUTPATIENT
Start: 2019-08-20

## 2019-08-20 RX ORDER — QUETIAPINE FUMARATE 50 MG/1
50 TABLET, FILM COATED ORAL NIGHTLY
Qty: 30 TABLET | Refills: 1 | OUTPATIENT
Start: 2019-08-20

## 2019-08-20 NOTE — TELEPHONE ENCOUNTER
Pt called requesting a refill on these medications    Last visit- 8/14/2019  Next visit- 9/4/2019    Requested Prescriptions     Pending Prescriptions Disp Refills    QUEtiapine (SEROQUEL) 50 MG tablet 30 tablet 1     Sig: Take 1 tablet by mouth nightly    QUEtiapine (SEROQUEL) 25 MG tablet 120 tablet 2     Sig: Take 4 tablets by mouth daily    promethazine (PHENERGAN) 25 MG tablet 15 tablet 0     Sig: Take 1 tablet by mouth every 6 hours as needed for Nausea

## 2019-08-22 DIAGNOSIS — F11.20 SEVERE OPIOID USE DISORDER (HCC): ICD-10-CM

## 2019-08-22 DIAGNOSIS — G89.4 CHRONIC PAIN SYNDROME: ICD-10-CM

## 2019-08-23 RX ORDER — GABAPENTIN 800 MG/1
800 TABLET ORAL 3 TIMES DAILY
Qty: 90 TABLET | Refills: 0 | Status: SHIPPED | OUTPATIENT
Start: 2019-08-23 | End: 2019-09-24 | Stop reason: SDUPTHER

## 2019-08-23 RX ORDER — PROMETHAZINE HYDROCHLORIDE 25 MG/1
25 TABLET ORAL 3 TIMES DAILY PRN
Qty: 90 TABLET | Refills: 0 | Status: SHIPPED | OUTPATIENT
Start: 2019-08-23 | End: 2019-08-30

## 2019-09-04 ENCOUNTER — OFFICE VISIT (OUTPATIENT)
Dept: INTERNAL MEDICINE CLINIC | Age: 39
End: 2019-09-04
Payer: MEDICARE

## 2019-09-04 VITALS
SYSTOLIC BLOOD PRESSURE: 123 MMHG | DIASTOLIC BLOOD PRESSURE: 70 MMHG | RESPIRATION RATE: 12 BRPM | HEIGHT: 66 IN | BODY MASS INDEX: 34.39 KG/M2 | WEIGHT: 214 LBS | HEART RATE: 84 BPM

## 2019-09-04 VITALS
SYSTOLIC BLOOD PRESSURE: 139 MMHG | HEIGHT: 66 IN | BODY MASS INDEX: 34.72 KG/M2 | HEART RATE: 98 BPM | WEIGHT: 216 LBS | DIASTOLIC BLOOD PRESSURE: 74 MMHG

## 2019-09-04 DIAGNOSIS — Z79.899 ENCOUNTER FOR MONITORING SUBOXONE MAINTENANCE THERAPY: ICD-10-CM

## 2019-09-04 DIAGNOSIS — E11.65 TYPE 2 DIABETES MELLITUS WITH HYPERGLYCEMIA, WITH LONG-TERM CURRENT USE OF INSULIN (HCC): ICD-10-CM

## 2019-09-04 DIAGNOSIS — Z51.81 ENCOUNTER FOR MONITORING SUBOXONE MAINTENANCE THERAPY: ICD-10-CM

## 2019-09-04 DIAGNOSIS — G89.4 CHRONIC PAIN SYNDROME: ICD-10-CM

## 2019-09-04 DIAGNOSIS — F11.20 SEVERE OPIOID USE DISORDER (HCC): Primary | ICD-10-CM

## 2019-09-04 DIAGNOSIS — J44.9 CHRONIC OBSTRUCTIVE PULMONARY DISEASE, UNSPECIFIED COPD TYPE (HCC): ICD-10-CM

## 2019-09-04 DIAGNOSIS — F31.9 BIPOLAR 1 DISORDER (HCC): ICD-10-CM

## 2019-09-04 DIAGNOSIS — F60.3 BORDERLINE PERSONALITY DISORDER (HCC): ICD-10-CM

## 2019-09-04 DIAGNOSIS — E66.9 DIABETES MELLITUS TYPE 2 IN OBESE (HCC): Primary | ICD-10-CM

## 2019-09-04 DIAGNOSIS — F41.9 ANXIETY: ICD-10-CM

## 2019-09-04 DIAGNOSIS — E11.69 DIABETES MELLITUS TYPE 2 IN OBESE (HCC): Primary | ICD-10-CM

## 2019-09-04 DIAGNOSIS — Z79.4 TYPE 2 DIABETES MELLITUS WITH HYPERGLYCEMIA, WITH LONG-TERM CURRENT USE OF INSULIN (HCC): ICD-10-CM

## 2019-09-04 PROCEDURE — 3046F HEMOGLOBIN A1C LEVEL >9.0%: CPT | Performed by: INTERNAL MEDICINE

## 2019-09-04 PROCEDURE — 2022F DILAT RTA XM EVC RTNOPTHY: CPT | Performed by: NURSE PRACTITIONER

## 2019-09-04 PROCEDURE — 4004F PT TOBACCO SCREEN RCVD TLK: CPT | Performed by: INTERNAL MEDICINE

## 2019-09-04 PROCEDURE — G8926 SPIRO NO PERF OR DOC: HCPCS | Performed by: NURSE PRACTITIONER

## 2019-09-04 PROCEDURE — G8427 DOCREV CUR MEDS BY ELIG CLIN: HCPCS | Performed by: NURSE PRACTITIONER

## 2019-09-04 PROCEDURE — 3023F SPIROM DOC REV: CPT | Performed by: NURSE PRACTITIONER

## 2019-09-04 PROCEDURE — 99213 OFFICE O/P EST LOW 20 MIN: CPT | Performed by: INTERNAL MEDICINE

## 2019-09-04 PROCEDURE — 3046F HEMOGLOBIN A1C LEVEL >9.0%: CPT | Performed by: NURSE PRACTITIONER

## 2019-09-04 PROCEDURE — 80305 DRUG TEST PRSMV DIR OPT OBS: CPT | Performed by: INTERNAL MEDICINE

## 2019-09-04 PROCEDURE — 4004F PT TOBACCO SCREEN RCVD TLK: CPT | Performed by: NURSE PRACTITIONER

## 2019-09-04 PROCEDURE — 2022F DILAT RTA XM EVC RTNOPTHY: CPT | Performed by: INTERNAL MEDICINE

## 2019-09-04 PROCEDURE — G8417 CALC BMI ABV UP PARAM F/U: HCPCS | Performed by: NURSE PRACTITIONER

## 2019-09-04 PROCEDURE — 99214 OFFICE O/P EST MOD 30 MIN: CPT | Performed by: NURSE PRACTITIONER

## 2019-09-04 PROCEDURE — G8427 DOCREV CUR MEDS BY ELIG CLIN: HCPCS | Performed by: INTERNAL MEDICINE

## 2019-09-04 PROCEDURE — G8417 CALC BMI ABV UP PARAM F/U: HCPCS | Performed by: INTERNAL MEDICINE

## 2019-09-04 RX ORDER — BUPRENORPHINE AND NALOXONE 2; .5 MG/1; MG/1
1 FILM, SOLUBLE BUCCAL; SUBLINGUAL DAILY
Qty: 21 FILM | Refills: 0 | Status: SHIPPED | OUTPATIENT
Start: 2019-09-04 | End: 2019-09-25 | Stop reason: SDUPTHER

## 2019-09-04 RX ORDER — QUETIAPINE FUMARATE 50 MG/1
50 TABLET, FILM COATED ORAL NIGHTLY
Qty: 30 TABLET | Refills: 0 | Status: SHIPPED | OUTPATIENT
Start: 2019-09-04 | End: 2019-09-25

## 2019-09-04 RX ORDER — BUPRENORPHINE AND NALOXONE 8; 2 MG/1; MG/1
1 FILM, SOLUBLE BUCCAL; SUBLINGUAL 2 TIMES DAILY
Qty: 42 FILM | Refills: 0 | Status: SHIPPED | OUTPATIENT
Start: 2019-09-04 | End: 2019-09-25 | Stop reason: SDUPTHER

## 2019-09-04 RX ORDER — GLIMEPIRIDE 2 MG/1
2 TABLET ORAL
Qty: 30 TABLET | Refills: 1 | Status: SHIPPED | OUTPATIENT
Start: 2019-09-04 | End: 2019-11-27 | Stop reason: SDUPTHER

## 2019-09-04 RX ORDER — QUETIAPINE FUMARATE 25 MG/1
100 TABLET, FILM COATED ORAL DAILY
Qty: 120 TABLET | Refills: 0 | Status: SHIPPED | OUTPATIENT
Start: 2019-09-04 | End: 2019-11-06 | Stop reason: SDUPTHER

## 2019-09-04 NOTE — PROGRESS NOTES
Verbal order per Dr. Daysi Persaud for urine drug screen. Positive for BUP. Verified results with José Luis Orozco LPN. Dr. Daysi Persaud ordered Suboxone 8 mg tab BID and 2 mg tab in afternoon for patient. Verified dose with patient. Patient was sent home with 3 week script for Suboxone 8 mg tab BID and 3 week script for Suboxone 2 mg tab in afternoon and will be seen back in the office on 9/25/2019. Urine levels sent for BUP.

## 2019-09-09 LAB
BUPRENORPHINE GLUCURONIDE URINE: 64 NG/ML
BUPRENORPHINE URINE: < 2 NG/ML
NALOXONE URINE: < 100 NG/ML
NORBUPRENORPHINE GLUCURONIDE URINE: 110 NG/ML
NORBUPRENORPHINE, URINE: NORMAL NG/ML

## 2019-09-23 DIAGNOSIS — G89.4 CHRONIC PAIN SYNDROME: ICD-10-CM

## 2019-09-23 DIAGNOSIS — F11.20 SEVERE OPIOID USE DISORDER (HCC): ICD-10-CM

## 2019-09-24 RX ORDER — GABAPENTIN 800 MG/1
800 TABLET ORAL 3 TIMES DAILY
Qty: 90 TABLET | Refills: 0 | Status: SHIPPED | OUTPATIENT
Start: 2019-09-24 | End: 2019-10-21 | Stop reason: SDUPTHER

## 2019-09-25 ENCOUNTER — OFFICE VISIT (OUTPATIENT)
Dept: INTERNAL MEDICINE CLINIC | Age: 39
End: 2019-09-25
Payer: MEDICARE

## 2019-09-25 VITALS
SYSTOLIC BLOOD PRESSURE: 147 MMHG | HEART RATE: 97 BPM | BODY MASS INDEX: 34.55 KG/M2 | WEIGHT: 215 LBS | DIASTOLIC BLOOD PRESSURE: 83 MMHG | HEIGHT: 66 IN

## 2019-09-25 DIAGNOSIS — E11.65 TYPE 2 DIABETES MELLITUS WITH HYPERGLYCEMIA, WITH LONG-TERM CURRENT USE OF INSULIN (HCC): ICD-10-CM

## 2019-09-25 DIAGNOSIS — Z51.81 ENCOUNTER FOR MONITORING SUBOXONE MAINTENANCE THERAPY: ICD-10-CM

## 2019-09-25 DIAGNOSIS — F31.9 BIPOLAR 1 DISORDER (HCC): ICD-10-CM

## 2019-09-25 DIAGNOSIS — G89.4 CHRONIC PAIN SYNDROME: ICD-10-CM

## 2019-09-25 DIAGNOSIS — Z79.4 TYPE 2 DIABETES MELLITUS WITH HYPERGLYCEMIA, WITH LONG-TERM CURRENT USE OF INSULIN (HCC): ICD-10-CM

## 2019-09-25 DIAGNOSIS — Z79.899 ENCOUNTER FOR MONITORING SUBOXONE MAINTENANCE THERAPY: ICD-10-CM

## 2019-09-25 DIAGNOSIS — F11.20 SEVERE OPIOID USE DISORDER (HCC): Primary | ICD-10-CM

## 2019-09-25 DIAGNOSIS — F60.3 BORDERLINE PERSONALITY DISORDER (HCC): ICD-10-CM

## 2019-09-25 PROCEDURE — G8427 DOCREV CUR MEDS BY ELIG CLIN: HCPCS | Performed by: INTERNAL MEDICINE

## 2019-09-25 PROCEDURE — 2022F DILAT RTA XM EVC RTNOPTHY: CPT | Performed by: INTERNAL MEDICINE

## 2019-09-25 PROCEDURE — 3046F HEMOGLOBIN A1C LEVEL >9.0%: CPT | Performed by: INTERNAL MEDICINE

## 2019-09-25 PROCEDURE — 99213 OFFICE O/P EST LOW 20 MIN: CPT | Performed by: INTERNAL MEDICINE

## 2019-09-25 PROCEDURE — 4004F PT TOBACCO SCREEN RCVD TLK: CPT | Performed by: INTERNAL MEDICINE

## 2019-09-25 PROCEDURE — 80305 DRUG TEST PRSMV DIR OPT OBS: CPT | Performed by: INTERNAL MEDICINE

## 2019-09-25 PROCEDURE — G8417 CALC BMI ABV UP PARAM F/U: HCPCS | Performed by: INTERNAL MEDICINE

## 2019-09-25 RX ORDER — BUPRENORPHINE AND NALOXONE 8; 2 MG/1; MG/1
1 FILM, SOLUBLE BUCCAL; SUBLINGUAL 2 TIMES DAILY
Qty: 42 FILM | Refills: 0 | Status: SHIPPED | OUTPATIENT
Start: 2019-09-25 | End: 2019-10-16 | Stop reason: SDUPTHER

## 2019-09-25 RX ORDER — BUPRENORPHINE AND NALOXONE 2; .5 MG/1; MG/1
1 FILM, SOLUBLE BUCCAL; SUBLINGUAL DAILY
Qty: 21 FILM | Refills: 0 | Status: SHIPPED | OUTPATIENT
Start: 2019-09-25 | End: 2019-10-16 | Stop reason: SDUPTHER

## 2019-09-25 NOTE — PROGRESS NOTES
Verbal order per Dr. Juan Pelletier for urine drug screen. Positive for BUP. Verified results with Maged Scott LPN. Dr. Juan Pelletier ordered Suboxone 8 mg film BID and 2 mg film daily for patient. Verified dose with patient. Patient was sent home with 3 week script for Suboxone 8 mg film BID and 3 week script for Suboxone 2 mg film daily and will be seen back in the office on 10/16/2019.
Drug Screen    buprenorphine-naloxone (SUBOXONE) 8-2 MG FILM SL film    buprenorphine-naloxone (SUBOXONE) 2-0.5 MG FILM SL film   2. Chronic pain syndrome     3. Encounter for monitoring Suboxone maintenance therapy     4. Bipolar 1 disorder ([facility]     5. Type 2 diabetes mellitus with hyperglycemia, with long-term current use of insulin ([facility])     6. [facility]line [facility]         Orders Placed This Encounter   Procedures    POCT Rapid Drug Screen     I told the patient here she is not forced to take the shot. It is available if she would like to pursue it. She is on 8 mg twice daily. I added 2 mg several weeks ago  She has a history of psychiatric issues including bipolar.   .  She also has chronic pain syndrome and is on Neurontin  I  set her up to see Dr. Hermelindo Munoz    Urine buprenorphine and metabolite levels appropriate last visit we will repeat, will repeat         follow-up 3 weeks

## 2019-10-03 ASSESSMENT — ENCOUNTER SYMPTOMS
PHOTOPHOBIA: 0
SORE THROAT: 0
TROUBLE SWALLOWING: 0
SHORTNESS OF BREATH: 0
WHEEZING: 0
ABDOMINAL PAIN: 0
ABDOMINAL DISTENTION: 0
SINUS PRESSURE: 0
CONSTIPATION: 0
SINUS PAIN: 0
VOMITING: 0
BLOOD IN STOOL: 0
DIARRHEA: 0
NAUSEA: 0
RHINORRHEA: 0
COUGH: 0

## 2019-10-15 ENCOUNTER — APPOINTMENT (OUTPATIENT)
Dept: GENERAL RADIOLOGY | Age: 39
End: 2019-10-15
Payer: MEDICARE

## 2019-10-15 ENCOUNTER — HOSPITAL ENCOUNTER (EMERGENCY)
Age: 39
Discharge: HOME | End: 2019-10-15
Payer: MEDICARE

## 2019-10-15 VITALS
OXYGEN SATURATION: 95 % | SYSTOLIC BLOOD PRESSURE: 142 MMHG | TEMPERATURE: 98.4 F | DIASTOLIC BLOOD PRESSURE: 84 MMHG | WEIGHT: 214 LBS | RESPIRATION RATE: 16 BRPM | BODY MASS INDEX: 34.39 KG/M2 | HEART RATE: 98 BPM | HEIGHT: 66 IN
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
## 2019-10-15 DIAGNOSIS — S16.1XXA STRAIN OF NECK MUSCLE, INITIAL ENCOUNTER: Primary | ICD-10-CM

## 2019-10-15 PROCEDURE — 99283 EMERGENCY DEPT VISIT LOW MDM: CPT

## 2019-10-15 PROCEDURE — 72040 X-RAY EXAM NECK SPINE 2-3 VW: CPT

## 2019-10-15 PROCEDURE — 6370000000 HC RX 637 (ALT 250 FOR IP): Performed by: NURSE PRACTITIONER

## 2019-10-15 RX ORDER — ORPHENADRINE CITRATE 100 MG/1
100 TABLET, EXTENDED RELEASE ORAL ONCE
Status: COMPLETED | OUTPATIENT
Start: 2019-10-15 | End: 2019-10-15

## 2019-10-15 RX ORDER — TIZANIDINE HYDROCHLORIDE 4 MG/1
4 CAPSULE, GELATIN COATED ORAL 3 TIMES DAILY PRN
Qty: 24 CAPSULE | Refills: 0 | Status: SHIPPED | OUTPATIENT
Start: 2019-10-15 | End: 2021-11-14

## 2019-10-15 RX ADMIN — ORPHENADRINE CITRATE 100 MG: 100 TABLET, EXTENDED RELEASE ORAL at 13:04

## 2019-10-15 ASSESSMENT — ENCOUNTER SYMPTOMS
PHOTOPHOBIA: 0
RHINORRHEA: 0
TROUBLE SWALLOWING: 0
CHEST TIGHTNESS: 0
SINUS PRESSURE: 0
VOMITING: 0
SHORTNESS OF BREATH: 0
CONSTIPATION: 0
WHEEZING: 0
COUGH: 0
BACK PAIN: 1
SINUS PAIN: 0
ABDOMINAL PAIN: 0
COLOR CHANGE: 0
DIARRHEA: 0
NAUSEA: 0
SORE THROAT: 0

## 2019-10-15 ASSESSMENT — PAIN DESCRIPTION - LOCATION: LOCATION: BACK;NECK

## 2019-10-15 ASSESSMENT — PAIN SCALES - GENERAL: PAINLEVEL_OUTOF10: 8

## 2019-10-15 ASSESSMENT — PAIN DESCRIPTION - PAIN TYPE: TYPE: ACUTE PAIN

## 2019-10-16 ENCOUNTER — OFFICE VISIT (OUTPATIENT)
Dept: INTERNAL MEDICINE CLINIC | Age: 39
End: 2019-10-16
Payer: MEDICARE

## 2019-10-16 VITALS
WEIGHT: 213 LBS | BODY MASS INDEX: 34.23 KG/M2 | RESPIRATION RATE: 12 BRPM | DIASTOLIC BLOOD PRESSURE: 76 MMHG | HEIGHT: 66 IN | SYSTOLIC BLOOD PRESSURE: 129 MMHG | HEART RATE: 102 BPM

## 2019-10-16 VITALS
BODY MASS INDEX: 33.75 KG/M2 | DIASTOLIC BLOOD PRESSURE: 83 MMHG | SYSTOLIC BLOOD PRESSURE: 135 MMHG | HEIGHT: 66 IN | WEIGHT: 210 LBS | HEART RATE: 94 BPM

## 2019-10-16 DIAGNOSIS — Z51.81 ENCOUNTER FOR MONITORING SUBOXONE MAINTENANCE THERAPY: ICD-10-CM

## 2019-10-16 DIAGNOSIS — E11.65 UNCONTROLLED TYPE 2 DIABETES MELLITUS WITH HYPERGLYCEMIA (HCC): ICD-10-CM

## 2019-10-16 DIAGNOSIS — F31.9 BIPOLAR 1 DISORDER (HCC): ICD-10-CM

## 2019-10-16 DIAGNOSIS — G89.4 CHRONIC PAIN SYNDROME: ICD-10-CM

## 2019-10-16 DIAGNOSIS — Z79.899 ENCOUNTER FOR MONITORING SUBOXONE MAINTENANCE THERAPY: ICD-10-CM

## 2019-10-16 DIAGNOSIS — Z72.0 TOBACCO ABUSE: ICD-10-CM

## 2019-10-16 DIAGNOSIS — J44.9 CHRONIC OBSTRUCTIVE PULMONARY DISEASE, UNSPECIFIED COPD TYPE (HCC): ICD-10-CM

## 2019-10-16 DIAGNOSIS — F11.20 SEVERE OPIOID USE DISORDER (HCC): Primary | ICD-10-CM

## 2019-10-16 DIAGNOSIS — Z11.3 SCREENING EXAMINATION FOR STD (SEXUALLY TRANSMITTED DISEASE): Primary | ICD-10-CM

## 2019-10-16 PROCEDURE — G8926 SPIRO NO PERF OR DOC: HCPCS | Performed by: NURSE PRACTITIONER

## 2019-10-16 PROCEDURE — 4004F PT TOBACCO SCREEN RCVD TLK: CPT | Performed by: NURSE PRACTITIONER

## 2019-10-16 PROCEDURE — 99213 OFFICE O/P EST LOW 20 MIN: CPT | Performed by: INTERNAL MEDICINE

## 2019-10-16 PROCEDURE — 3046F HEMOGLOBIN A1C LEVEL >9.0%: CPT | Performed by: NURSE PRACTITIONER

## 2019-10-16 PROCEDURE — 2022F DILAT RTA XM EVC RTNOPTHY: CPT | Performed by: NURSE PRACTITIONER

## 2019-10-16 PROCEDURE — G8427 DOCREV CUR MEDS BY ELIG CLIN: HCPCS | Performed by: INTERNAL MEDICINE

## 2019-10-16 PROCEDURE — 99214 OFFICE O/P EST MOD 30 MIN: CPT | Performed by: NURSE PRACTITIONER

## 2019-10-16 PROCEDURE — 80305 DRUG TEST PRSMV DIR OPT OBS: CPT | Performed by: INTERNAL MEDICINE

## 2019-10-16 PROCEDURE — G8427 DOCREV CUR MEDS BY ELIG CLIN: HCPCS | Performed by: NURSE PRACTITIONER

## 2019-10-16 PROCEDURE — G8484 FLU IMMUNIZE NO ADMIN: HCPCS | Performed by: INTERNAL MEDICINE

## 2019-10-16 PROCEDURE — G8484 FLU IMMUNIZE NO ADMIN: HCPCS | Performed by: NURSE PRACTITIONER

## 2019-10-16 PROCEDURE — G8417 CALC BMI ABV UP PARAM F/U: HCPCS | Performed by: INTERNAL MEDICINE

## 2019-10-16 PROCEDURE — 3023F SPIROM DOC REV: CPT | Performed by: NURSE PRACTITIONER

## 2019-10-16 PROCEDURE — G8417 CALC BMI ABV UP PARAM F/U: HCPCS | Performed by: NURSE PRACTITIONER

## 2019-10-16 PROCEDURE — 4004F PT TOBACCO SCREEN RCVD TLK: CPT | Performed by: INTERNAL MEDICINE

## 2019-10-16 RX ORDER — BUPRENORPHINE AND NALOXONE 8; 2 MG/1; MG/1
1 FILM, SOLUBLE BUCCAL; SUBLINGUAL 2 TIMES DAILY
Qty: 42 FILM | Refills: 0 | Status: SHIPPED | OUTPATIENT
Start: 2019-10-16 | End: 2019-11-06 | Stop reason: SDUPTHER

## 2019-10-16 RX ORDER — BUPRENORPHINE AND NALOXONE 2; .5 MG/1; MG/1
1 FILM, SOLUBLE BUCCAL; SUBLINGUAL DAILY
Qty: 21 FILM | Refills: 0 | Status: SHIPPED | OUTPATIENT
Start: 2019-10-16 | End: 2019-11-06 | Stop reason: SDUPTHER

## 2019-10-16 RX ORDER — NICOTINE 21 MG/24HR
1 PATCH, TRANSDERMAL 24 HOURS TRANSDERMAL EVERY 24 HOURS
Qty: 30 PATCH | Refills: 3 | Status: SHIPPED | OUTPATIENT
Start: 2019-10-16 | End: 2021-11-13

## 2019-10-16 RX ORDER — PROMETHAZINE HYDROCHLORIDE 25 MG/1
25 TABLET ORAL EVERY 6 HOURS PRN
Qty: 15 TABLET | Refills: 0 | Status: SHIPPED | OUTPATIENT
Start: 2019-10-16 | End: 2019-11-27 | Stop reason: SDUPTHER

## 2019-10-21 DIAGNOSIS — G89.4 CHRONIC PAIN SYNDROME: ICD-10-CM

## 2019-10-21 DIAGNOSIS — F11.20 SEVERE OPIOID USE DISORDER (HCC): ICD-10-CM

## 2019-10-21 RX ORDER — GABAPENTIN 800 MG/1
800 TABLET ORAL 3 TIMES DAILY
Qty: 90 TABLET | Refills: 0 | Status: SHIPPED | OUTPATIENT
Start: 2019-10-21 | End: 2019-11-22 | Stop reason: SDUPTHER

## 2019-11-05 ENCOUNTER — TELEPHONE (OUTPATIENT)
Dept: INTERNAL MEDICINE CLINIC | Age: 39
End: 2019-11-05

## 2019-11-05 NOTE — TELEPHONE ENCOUNTER
I tried to call patient to tell her appt was changed to 10:15am with DR. MARCELO  When I call ph# I was told that patient did not live there anymore

## 2019-11-06 ENCOUNTER — OFFICE VISIT (OUTPATIENT)
Dept: PSYCHIATRY | Age: 39
End: 2019-11-06
Payer: MEDICARE

## 2019-11-06 VITALS
DIASTOLIC BLOOD PRESSURE: 85 MMHG | HEART RATE: 98 BPM | BODY MASS INDEX: 34.55 KG/M2 | WEIGHT: 215 LBS | SYSTOLIC BLOOD PRESSURE: 136 MMHG | HEIGHT: 66 IN

## 2019-11-06 DIAGNOSIS — F11.20 SEVERE OPIOID USE DISORDER (HCC): Primary | ICD-10-CM

## 2019-11-06 PROCEDURE — G8417 CALC BMI ABV UP PARAM F/U: HCPCS | Performed by: PSYCHIATRY & NEUROLOGY

## 2019-11-06 PROCEDURE — 99213 OFFICE O/P EST LOW 20 MIN: CPT | Performed by: PSYCHIATRY & NEUROLOGY

## 2019-11-06 PROCEDURE — G8427 DOCREV CUR MEDS BY ELIG CLIN: HCPCS | Performed by: PSYCHIATRY & NEUROLOGY

## 2019-11-06 PROCEDURE — 4004F PT TOBACCO SCREEN RCVD TLK: CPT | Performed by: PSYCHIATRY & NEUROLOGY

## 2019-11-06 PROCEDURE — G8484 FLU IMMUNIZE NO ADMIN: HCPCS | Performed by: PSYCHIATRY & NEUROLOGY

## 2019-11-06 PROCEDURE — 80305 DRUG TEST PRSMV DIR OPT OBS: CPT | Performed by: PSYCHIATRY & NEUROLOGY

## 2019-11-06 RX ORDER — BUPRENORPHINE AND NALOXONE 2; .5 MG/1; MG/1
1 FILM, SOLUBLE BUCCAL; SUBLINGUAL DAILY
Qty: 21 FILM | Refills: 0 | Status: SHIPPED | OUTPATIENT
Start: 2019-11-06 | End: 2019-11-27

## 2019-11-06 RX ORDER — QUETIAPINE 200 MG/1
200 TABLET, FILM COATED, EXTENDED RELEASE ORAL NIGHTLY
Qty: 30 TABLET | Refills: 3 | Status: SHIPPED | OUTPATIENT
Start: 2019-11-06 | End: 2019-12-23 | Stop reason: SDUPTHER

## 2019-11-06 RX ORDER — BUPRENORPHINE AND NALOXONE 8; 2 MG/1; MG/1
1 FILM, SOLUBLE BUCCAL; SUBLINGUAL 2 TIMES DAILY
Qty: 42 FILM | Refills: 0 | Status: SHIPPED | OUTPATIENT
Start: 2019-11-06 | End: 2019-11-27

## 2019-11-06 RX ORDER — QUETIAPINE FUMARATE 25 MG/1
100 TABLET, FILM COATED ORAL DAILY
Qty: 120 TABLET | Refills: 0 | Status: SHIPPED | OUTPATIENT
Start: 2019-11-06 | End: 2019-12-23 | Stop reason: SDUPTHER

## 2019-11-09 ASSESSMENT — ENCOUNTER SYMPTOMS
PHOTOPHOBIA: 0
ABDOMINAL DISTENTION: 0
CONSTIPATION: 0
RHINORRHEA: 0
SHORTNESS OF BREATH: 0
SORE THROAT: 0
SINUS PRESSURE: 0
WHEEZING: 0
NAUSEA: 1
DIARRHEA: 0
BLOOD IN STOOL: 0
VOMITING: 0
ABDOMINAL PAIN: 0
COUGH: 0
TROUBLE SWALLOWING: 0
SINUS PAIN: 0

## 2019-11-21 DIAGNOSIS — F11.20 SEVERE OPIOID USE DISORDER (HCC): ICD-10-CM

## 2019-11-21 DIAGNOSIS — G89.4 CHRONIC PAIN SYNDROME: ICD-10-CM

## 2019-11-22 RX ORDER — GABAPENTIN 800 MG/1
800 TABLET ORAL 3 TIMES DAILY
Qty: 90 TABLET | Refills: 0 | Status: SHIPPED | OUTPATIENT
Start: 2019-11-22 | End: 2019-12-23 | Stop reason: SDUPTHER

## 2019-11-26 ASSESSMENT — ENCOUNTER SYMPTOMS
WHEEZING: 0
VOMITING: 0
TROUBLE SWALLOWING: 0
CONSTIPATION: 0
SINUS PAIN: 0
SINUS PRESSURE: 0
SHORTNESS OF BREATH: 0
NAUSEA: 1
PHOTOPHOBIA: 0
DIARRHEA: 0
ABDOMINAL PAIN: 0
RHINORRHEA: 0
ABDOMINAL DISTENTION: 0
SORE THROAT: 0
COUGH: 0
BLOOD IN STOOL: 0

## 2019-11-27 ENCOUNTER — OFFICE VISIT (OUTPATIENT)
Dept: INTERNAL MEDICINE CLINIC | Age: 39
End: 2019-11-27
Payer: MEDICARE

## 2019-11-27 ENCOUNTER — TELEPHONE (OUTPATIENT)
Dept: INTERNAL MEDICINE CLINIC | Age: 39
End: 2019-11-27

## 2019-11-27 VITALS
DIASTOLIC BLOOD PRESSURE: 52 MMHG | HEART RATE: 68 BPM | RESPIRATION RATE: 12 BRPM | HEIGHT: 66 IN | BODY MASS INDEX: 33.27 KG/M2 | SYSTOLIC BLOOD PRESSURE: 107 MMHG | WEIGHT: 207 LBS

## 2019-11-27 DIAGNOSIS — E66.9 DIABETES MELLITUS TYPE 2 IN OBESE (HCC): Primary | ICD-10-CM

## 2019-11-27 DIAGNOSIS — F11.20 SEVERE OPIOID USE DISORDER (HCC): ICD-10-CM

## 2019-11-27 DIAGNOSIS — E11.69 DIABETES MELLITUS TYPE 2 IN OBESE (HCC): Primary | ICD-10-CM

## 2019-11-27 DIAGNOSIS — J44.9 CHRONIC OBSTRUCTIVE PULMONARY DISEASE, UNSPECIFIED COPD TYPE (HCC): ICD-10-CM

## 2019-11-27 LAB — HBA1C MFR BLD: 12.6 % (ref 4.3–5.7)

## 2019-11-27 PROCEDURE — 3023F SPIROM DOC REV: CPT | Performed by: NURSE PRACTITIONER

## 2019-11-27 PROCEDURE — G8417 CALC BMI ABV UP PARAM F/U: HCPCS | Performed by: NURSE PRACTITIONER

## 2019-11-27 PROCEDURE — 99214 OFFICE O/P EST MOD 30 MIN: CPT | Performed by: NURSE PRACTITIONER

## 2019-11-27 PROCEDURE — 80305 DRUG TEST PRSMV DIR OPT OBS: CPT | Performed by: NURSE PRACTITIONER

## 2019-11-27 PROCEDURE — G8427 DOCREV CUR MEDS BY ELIG CLIN: HCPCS | Performed by: NURSE PRACTITIONER

## 2019-11-27 PROCEDURE — G8926 SPIRO NO PERF OR DOC: HCPCS | Performed by: NURSE PRACTITIONER

## 2019-11-27 PROCEDURE — 3046F HEMOGLOBIN A1C LEVEL >9.0%: CPT | Performed by: NURSE PRACTITIONER

## 2019-11-27 PROCEDURE — 4004F PT TOBACCO SCREEN RCVD TLK: CPT | Performed by: NURSE PRACTITIONER

## 2019-11-27 PROCEDURE — G8484 FLU IMMUNIZE NO ADMIN: HCPCS | Performed by: NURSE PRACTITIONER

## 2019-11-27 PROCEDURE — 83036 HEMOGLOBIN GLYCOSYLATED A1C: CPT | Performed by: NURSE PRACTITIONER

## 2019-11-27 PROCEDURE — 2022F DILAT RTA XM EVC RTNOPTHY: CPT | Performed by: NURSE PRACTITIONER

## 2019-11-27 RX ORDER — GLIMEPIRIDE 2 MG/1
4 TABLET ORAL
Qty: 30 TABLET | Refills: 1 | Status: SHIPPED | OUTPATIENT
Start: 2019-11-27 | End: 2020-01-21 | Stop reason: SDUPTHER

## 2019-11-27 RX ORDER — PROMETHAZINE HYDROCHLORIDE 25 MG/1
25 TABLET ORAL EVERY 6 HOURS PRN
Qty: 15 TABLET | Refills: 0 | Status: SHIPPED | OUTPATIENT
Start: 2019-11-27 | End: 2019-12-23 | Stop reason: SDUPTHER

## 2019-11-27 RX ORDER — NYSTATIN 100000 [USP'U]/G
POWDER TOPICAL
Qty: 2 BOTTLE | Refills: 0 | Status: SHIPPED | OUTPATIENT
Start: 2019-11-27

## 2019-12-23 ENCOUNTER — OFFICE VISIT (OUTPATIENT)
Dept: INTERNAL MEDICINE CLINIC | Age: 39
End: 2019-12-23
Payer: MEDICARE

## 2019-12-23 VITALS
BODY MASS INDEX: 33.27 KG/M2 | DIASTOLIC BLOOD PRESSURE: 89 MMHG | WEIGHT: 207 LBS | HEIGHT: 66 IN | SYSTOLIC BLOOD PRESSURE: 138 MMHG | HEART RATE: 112 BPM

## 2019-12-23 DIAGNOSIS — J44.9 CHRONIC OBSTRUCTIVE PULMONARY DISEASE, UNSPECIFIED COPD TYPE (HCC): ICD-10-CM

## 2019-12-23 DIAGNOSIS — G89.4 CHRONIC PAIN SYNDROME: ICD-10-CM

## 2019-12-23 DIAGNOSIS — R11.0 NAUSEA: ICD-10-CM

## 2019-12-23 DIAGNOSIS — E11.69 DIABETES MELLITUS TYPE 2 IN OBESE (HCC): ICD-10-CM

## 2019-12-23 DIAGNOSIS — Z79.899 ENCOUNTER FOR MONITORING SUBOXONE MAINTENANCE THERAPY: ICD-10-CM

## 2019-12-23 DIAGNOSIS — Z51.81 ENCOUNTER FOR MONITORING SUBOXONE MAINTENANCE THERAPY: ICD-10-CM

## 2019-12-23 DIAGNOSIS — F11.20 SEVERE OPIOID USE DISORDER (HCC): Primary | ICD-10-CM

## 2019-12-23 DIAGNOSIS — E66.9 DIABETES MELLITUS TYPE 2 IN OBESE (HCC): ICD-10-CM

## 2019-12-23 PROCEDURE — 80305 DRUG TEST PRSMV DIR OPT OBS: CPT | Performed by: INTERNAL MEDICINE

## 2019-12-23 PROCEDURE — G8427 DOCREV CUR MEDS BY ELIG CLIN: HCPCS | Performed by: INTERNAL MEDICINE

## 2019-12-23 PROCEDURE — G8484 FLU IMMUNIZE NO ADMIN: HCPCS | Performed by: INTERNAL MEDICINE

## 2019-12-23 PROCEDURE — 3046F HEMOGLOBIN A1C LEVEL >9.0%: CPT | Performed by: INTERNAL MEDICINE

## 2019-12-23 PROCEDURE — G8926 SPIRO NO PERF OR DOC: HCPCS | Performed by: INTERNAL MEDICINE

## 2019-12-23 PROCEDURE — 4004F PT TOBACCO SCREEN RCVD TLK: CPT | Performed by: INTERNAL MEDICINE

## 2019-12-23 PROCEDURE — G8417 CALC BMI ABV UP PARAM F/U: HCPCS | Performed by: INTERNAL MEDICINE

## 2019-12-23 PROCEDURE — 3023F SPIROM DOC REV: CPT | Performed by: INTERNAL MEDICINE

## 2019-12-23 PROCEDURE — 99213 OFFICE O/P EST LOW 20 MIN: CPT | Performed by: INTERNAL MEDICINE

## 2019-12-23 PROCEDURE — 2022F DILAT RTA XM EVC RTNOPTHY: CPT | Performed by: INTERNAL MEDICINE

## 2019-12-23 RX ORDER — QUETIAPINE 200 MG/1
200 TABLET, FILM COATED, EXTENDED RELEASE ORAL NIGHTLY
Qty: 30 TABLET | Refills: 3 | Status: SHIPPED | OUTPATIENT
Start: 2019-12-23 | End: 2020-01-21 | Stop reason: ALTCHOICE

## 2019-12-23 RX ORDER — BUPRENORPHINE AND NALOXONE 8; 2 MG/1; MG/1
1 FILM, SOLUBLE BUCCAL; SUBLINGUAL 2 TIMES DAILY
COMMUNITY

## 2019-12-23 RX ORDER — BUPRENORPHINE AND NALOXONE 8; 2 MG/1; MG/1
1 FILM, SOLUBLE BUCCAL; SUBLINGUAL 2 TIMES DAILY
Qty: 42 FILM | Refills: 0 | Status: SHIPPED | OUTPATIENT
Start: 2019-12-23 | End: 2020-01-13 | Stop reason: SDUPTHER

## 2019-12-23 RX ORDER — BUPRENORPHINE AND NALOXONE 2; .5 MG/1; MG/1
1 FILM, SOLUBLE BUCCAL; SUBLINGUAL DAILY
COMMUNITY
End: 2021-11-13

## 2019-12-23 RX ORDER — GABAPENTIN 800 MG/1
800 TABLET ORAL 3 TIMES DAILY
Qty: 90 TABLET | Refills: 0 | Status: SHIPPED | OUTPATIENT
Start: 2019-12-23 | End: 2020-01-21 | Stop reason: SDUPTHER

## 2019-12-23 RX ORDER — QUETIAPINE FUMARATE 25 MG/1
100 TABLET, FILM COATED ORAL DAILY
Qty: 120 TABLET | Refills: 0 | Status: SHIPPED | OUTPATIENT
Start: 2019-12-23 | End: 2020-01-21 | Stop reason: SDUPTHER

## 2019-12-23 RX ORDER — PROMETHAZINE HYDROCHLORIDE 25 MG/1
25 TABLET ORAL EVERY 6 HOURS PRN
Qty: 15 TABLET | Refills: 0 | Status: SHIPPED | OUTPATIENT
Start: 2019-12-23 | End: 2020-01-21 | Stop reason: SDUPTHER

## 2020-01-13 ENCOUNTER — OFFICE VISIT (OUTPATIENT)
Dept: INTERNAL MEDICINE CLINIC | Age: 40
End: 2020-01-13
Payer: MEDICARE

## 2020-01-13 VITALS
WEIGHT: 204 LBS | DIASTOLIC BLOOD PRESSURE: 74 MMHG | HEART RATE: 97 BPM | SYSTOLIC BLOOD PRESSURE: 171 MMHG | HEIGHT: 66 IN | BODY MASS INDEX: 32.78 KG/M2

## 2020-01-13 PROCEDURE — 4004F PT TOBACCO SCREEN RCVD TLK: CPT | Performed by: INTERNAL MEDICINE

## 2020-01-13 PROCEDURE — G8427 DOCREV CUR MEDS BY ELIG CLIN: HCPCS | Performed by: INTERNAL MEDICINE

## 2020-01-13 PROCEDURE — 99213 OFFICE O/P EST LOW 20 MIN: CPT | Performed by: INTERNAL MEDICINE

## 2020-01-13 PROCEDURE — G8484 FLU IMMUNIZE NO ADMIN: HCPCS | Performed by: INTERNAL MEDICINE

## 2020-01-13 PROCEDURE — 80305 DRUG TEST PRSMV DIR OPT OBS: CPT | Performed by: INTERNAL MEDICINE

## 2020-01-13 PROCEDURE — G8417 CALC BMI ABV UP PARAM F/U: HCPCS | Performed by: INTERNAL MEDICINE

## 2020-01-13 RX ORDER — BUPRENORPHINE AND NALOXONE 8; 2 MG/1; MG/1
1 FILM, SOLUBLE BUCCAL; SUBLINGUAL 2 TIMES DAILY
Qty: 56 FILM | Refills: 0 | Status: SHIPPED | OUTPATIENT
Start: 2020-01-13 | End: 2020-02-10 | Stop reason: SDUPTHER

## 2020-01-13 NOTE — PROGRESS NOTES
[facility] PROFESSIONAL SERVS  PHYSICIANS [facility] [address] OFFPATRICK BASS.PADDY, Dimitrios Brown  Dept: [phone]  Dept Fax: [phone]      Chief Complaint   Patient presents with    Drug Problem       Patient presents for evaluation of opioid use. I saw her 12/23  She saw Dr. Kain Tamez   This patient is followed regularly by  Kathy Daniels CNP. Patient said she is followed for the last 6 months at the [address] [place] [address] clinic by Kathy Daniels. She gives her Suboxone as well as her psych medications. She says she wants to come here because she was told she had to take the Suboxone shot. She had no choice. She started using pain pills at age 23. She was taking up to 10 Percocets daily. History of cocaine, none recently. She's been on Suboxone several times, but relapsed. Most recently after an inpatient stay          Past Medical History:   Diagnosis Date    [facility]     Diabetes mellitus ([facility]     Drug addiction ([facility]     HPV (human papilloma virus) infection     Hypertension        Current Outpatient Medications   Medication Sig Dispense Refill    buprenorphine-naloxone (SUBOXONE) 8-2 MG FILM SL film Place 1 Film under the tongue 2 times daily for 28 days. 56 Film 0    buprenorphine-naloxone (SUBOXONE) 2-0.5 MG FILM SL film Place 1 Film under the tongue daily. Once in afternoon      buprenorphine-naloxone (SUBOXONE) 8-2 MG FILM SL film Place 1 Film under the tongue 2 times daily.  gabapentin (NEURONTIN) 800 MG tablet Take 1 tablet by mouth 3 times daily for 30 days. 90 tablet 0    promethazine (PHENERGAN) 25 MG tablet Take 1 tablet by mouth every 6 hours as needed for Nausea 15 tablet 0    QUEtiapine (SEROQUEL XR) 200 MG extended release tablet Take 1 tablet by mouth nightly 30 tablet 3    QUEtiapine (SEROQUEL) 25 MG tablet Take 4 tablets by mouth daily 120 tablet 0    miconazole (ZEASORB-AF) 2 % powder Apply topically 2 times daily.  45 g 1    glimepiride (AMARYL) 2 MG tablet Take 2 tablets by mouth every morning (before breakfast) 30 tablet 1    nystatin (MYCOSTATIN) 781950 UNIT/GM powder Apply 3 times daily. 2 Bottle 0    nicotine (NICODERM CQ) 21 MG/24HR Place 1 patch onto the skin every 24 hours 30 patch 3    Insulin Pen Needle 31G X 5 MM MISC 1 each by Does not apply route daily 100 each 3    tiZANidine (ZANAFLEX) 4 MG capsule Take 1 capsule by mouth 3 times daily as needed for Muscle spasms 24 capsule 0    venlafaxine (EFFEXOR) 75 MG tablet Take 225 mg by mouth daily      insulin lispro (HUMALOG) 100 UNIT/ML injection vial Inject 15 Units into the skin 3 times daily (before meals) Plus sliding scale      albuterol sulfate HFA (PROVENTIL HFA) 108 (90 Base) MCG/ACT inhaler Inhale 2 puffs into the lungs every 6 hours as needed for Wheezing 1 Inhaler 3    ibuprofen (ADVIL;MOTRIN) 800 MG tablet Take 800 mg by mouth every 6 hours as needed. No current facility-administered medications for this visit. Review of Systems - . She says she had some urges, but she thinks it  is from her anxiety    Blood pressure (!) 171/74, pulse 97, height 5' 6\" (1.676 m), weight 204 lb (92.5 kg), not currently breastfeeding. Physical Examination: General appearance - alert, well appearing, and in no distress  HEENT-head normocephalic, atraumatic  Mental status - alert, oriented to person, place, and time    Urine tox screen today. Recent Labs     01/13/20  1307   LABAMPH NEG   LABBARB NEG   LABBENZ NEG   BUPRENUR POS   COCAIMETSCRU NEG   GABAPENTIN N/A   MDMA NEG   METAMPU NEG   LABMETH NEG   OPIATESCREENURINE NEG   OXTCOSU NEG   PHENCYCLIDINESCREENURINE NEG   PROPOXYPHENE N/A   THCSCREENUR NEG   TRICYUR N/A        Diagnosis Orders   1. Severe opioid use disorder (HCC)  POCT Rapid Drug Screen    buprenorphine-naloxone (SUBOXONE) 8-2 MG FILM SL film   2. Chronic pain syndrome     3. Encounter for monitoring Suboxone maintenance therapy     4.  Bipolar 1 disorder [facility])         Orders Placed This Encounter   Procedures    POCT Rapid Drug Screen     I told the patient here she is not forced to take the shot. It is available if she would like to pursue it. She is on 8 mg twice daily. She has a history of psychiatric issues including bipolar.   .  She also has chronic pain syndrome and is on Neurontin  Patient saw Dr. Martina Edwards a couple times but she is trying to get him to go months    Comprehensive urine showed no inconsistencies last visit         follow-up 3 weeks

## 2020-01-13 NOTE — PROGRESS NOTES
Verbal order per Dr. Vincent Sandoval for urine drug screen. Positive for BUP. Verified results with Emily Mcrae RN. Dr. Vincent Sandoval ordered Suboxone 8mg film BID  for patient. Verified dose with patient. Patient was sent home with 4 week script of Suboxone 8mg film BID and will be seen back in the office 2/10/2020. UC sent.

## 2020-01-21 ENCOUNTER — OFFICE VISIT (OUTPATIENT)
Dept: INTERNAL MEDICINE CLINIC | Age: 40
End: 2020-01-21
Payer: MEDICARE

## 2020-01-21 VITALS
RESPIRATION RATE: 14 BRPM | HEART RATE: 94 BPM | WEIGHT: 206 LBS | SYSTOLIC BLOOD PRESSURE: 124 MMHG | DIASTOLIC BLOOD PRESSURE: 81 MMHG | BODY MASS INDEX: 33.11 KG/M2 | HEIGHT: 66 IN

## 2020-01-21 PROCEDURE — 4004F PT TOBACCO SCREEN RCVD TLK: CPT | Performed by: NURSE PRACTITIONER

## 2020-01-21 PROCEDURE — G8417 CALC BMI ABV UP PARAM F/U: HCPCS | Performed by: NURSE PRACTITIONER

## 2020-01-21 PROCEDURE — 3023F SPIROM DOC REV: CPT | Performed by: NURSE PRACTITIONER

## 2020-01-21 PROCEDURE — 99214 OFFICE O/P EST MOD 30 MIN: CPT | Performed by: NURSE PRACTITIONER

## 2020-01-21 PROCEDURE — 2022F DILAT RTA XM EVC RTNOPTHY: CPT | Performed by: NURSE PRACTITIONER

## 2020-01-21 PROCEDURE — 3046F HEMOGLOBIN A1C LEVEL >9.0%: CPT | Performed by: NURSE PRACTITIONER

## 2020-01-21 PROCEDURE — G8926 SPIRO NO PERF OR DOC: HCPCS | Performed by: NURSE PRACTITIONER

## 2020-01-21 PROCEDURE — G8427 DOCREV CUR MEDS BY ELIG CLIN: HCPCS | Performed by: NURSE PRACTITIONER

## 2020-01-21 PROCEDURE — G8484 FLU IMMUNIZE NO ADMIN: HCPCS | Performed by: NURSE PRACTITIONER

## 2020-01-21 RX ORDER — VENLAFAXINE 75 MG/1
225 TABLET ORAL DAILY
Qty: 30 TABLET | Refills: 3 | Status: SHIPPED | OUTPATIENT
Start: 2020-01-21 | End: 2021-11-13

## 2020-01-21 RX ORDER — PROMETHAZINE HYDROCHLORIDE 25 MG/1
25 TABLET ORAL EVERY 6 HOURS PRN
Qty: 15 TABLET | Refills: 0 | Status: SHIPPED | OUTPATIENT
Start: 2020-01-21 | End: 2020-02-10 | Stop reason: SDUPTHER

## 2020-01-21 RX ORDER — QUETIAPINE FUMARATE 100 MG/1
100 TABLET, FILM COATED ORAL DAILY
Qty: 30 TABLET | Refills: 3 | Status: SHIPPED | OUTPATIENT
Start: 2020-01-21

## 2020-01-21 RX ORDER — GABAPENTIN 800 MG/1
800 TABLET ORAL 3 TIMES DAILY
Qty: 90 TABLET | Refills: 2 | Status: SHIPPED | OUTPATIENT
Start: 2020-01-21 | End: 2020-04-20

## 2020-01-21 RX ORDER — QUETIAPINE FUMARATE 300 MG/1
300 TABLET, FILM COATED ORAL NIGHTLY
Qty: 30 TABLET | Refills: 3 | Status: SHIPPED | OUTPATIENT
Start: 2020-01-21

## 2020-01-21 RX ORDER — GLIMEPIRIDE 2 MG/1
4 TABLET ORAL
Qty: 30 TABLET | Refills: 3 | Status: SHIPPED | OUTPATIENT
Start: 2020-01-21 | End: 2021-11-14

## 2020-01-21 NOTE — PROGRESS NOTES
times daily for 90 days. Yes YANNICK Wilkins CNP   promethazine (PHENERGAN) 25 MG tablet Take 1 tablet by mouth every 6 hours as needed for Nausea Yes YANNICK Wilkins CNP   buprenorphine-naloxone (SUBOXONE) 8-2 MG FILM SL film Place 1 Film under the tongue 2 times daily for 28 days. Yes Mannie Tello MD   buprenorphine-naloxone (SUBOXONE) 2-0.5 MG FILM SL film Place 1 Film under the tongue daily. Once in afternoon Yes Historical Provider, MD   buprenorphine-naloxone (SUBOXONE) 8-2 MG FILM SL film Place 1 Film under the tongue 2 times daily. Yes Historical Provider, MD   miconazole (ZEASORB-AF) 2 % powder Apply topically 2 times daily. Yes YANNICK Wilkins CNP   nystatin (MYCOSTATIN) 437184 UNIT/GM powder Apply 3 times daily. Yes YANNICK Wilkins CNP   nicotine (NICODERM CQ) 21 MG/24HR Place 1 patch onto the skin every 24 hours Yes YANNICK Wilkins CNP   Insulin Pen Needle 31G X 5 MM MISC 1 each by Does not apply route daily Yes YANNICK Wilkins CNP   tiZANidine (ZANAFLEX) 4 MG capsule Take 1 capsule by mouth 3 times daily as needed for Muscle spasms Yes [place] Najjar, APRN - CNP   insulin lispro (HUMALOG) 100 UNIT/ML injection vial Inject 15 Units into the skin 3 times daily (before meals) Plus sliding scale Yes Historical Provider, MD   albuterol sulfate HFA (PROVENTIL HFA) 108 (90 Base) MCG/ACT inhaler Inhale 2 puffs into the lungs every 6 hours as needed for Wheezing Yes YANNICK Munoz CNP   ibuprofen (ADVIL;MOTRIN) 800 MG tablet Take 800 mg by mouth every 6 hours as needed.  Yes Historical Provider, MD        Social History     Tobacco Use    Smoking status: Current Every Day Smoker     Packs/day: 1.00     Years: 17.00     Pack years: 17.00     Types: Cigarettes    Smokeless tobacco: Never Used   Substance Use Topics    Alcohol use: No        Vitals:    01/21/20 1133   BP: 124/81   Site: Right Upper Arm   Position: Sitting   Cuff Size: Large Adult   Pulse: 94   Resp: 14   Weight: 206 lb (93.4 kg)   Height: 5' 5.98\" (1.676 m)     Estimated body mass index is 33.27 kg/m² as calculated from the following:    Height as of this encounter: 5' 5.98\" (1.676 m). Weight as of this encounter: 206 lb (93.4 kg). Physical Exam  Constitutional:       General: She is not in acute distress. Appearance: She is well-developed. HENT:      Head: Normocephalic and atraumatic. Right Ear: Tympanic membrane, ear canal and external ear normal.      Left Ear: Tympanic membrane, ear canal and external ear normal.      Nose: Nose normal.      Mouth/Throat:      Mouth: Mucous membranes are moist.      Pharynx: Oropharynx is clear. No oropharyngeal exudate. Eyes:      Conjunctiva/sclera: Conjunctivae normal.      Pupils: Pupils are equal, round, and reactive to light. Neck:      Musculoskeletal: Normal range of motion and neck supple. Thyroid: No thyromegaly. Vascular: No JVD. Cardiovascular:      Rate and Rhythm: Normal rate and regular rhythm. Heart sounds: Normal heart sounds. No murmur. No friction rub. No gallop. Pulmonary:      Effort: Pulmonary effort is normal. No respiratory distress. Breath sounds: Normal breath sounds. No wheezing or rales. Abdominal:      General: Bowel sounds are normal. There is no distension. Palpations: Abdomen is soft. Tenderness: There is no abdominal tenderness. Musculoskeletal: Normal range of motion. Lymphadenopathy:      Cervical: No cervical adenopathy. Skin:     General: Skin is warm and dry. Capillary Refill: Capillary refill takes less than 2 seconds. Neurological:      Mental Status: She is alert and oriented to person, place, and time. Psychiatric:         Behavior: Behavior normal.         Thought Content: Thought content normal.         Judgment: Judgment normal.         ASSESSMENT/PLAN:    1.  Diabetes mellitus type 2 in obese (HCC)    - Hemoglobin A1C; Future  - glimepiride (AMARYL) 2 MG tablet; Take 2 tablets by mouth every morning (before breakfast)  Dispense: 30 tablet; Refill: 3  - Increase Glimepiride to 4 mg daily  - Bring meter to next visit. Patient reports compliance with checking blood sugars and medications, however does not bring her meter to visit for review. - Encouraged dietary modifications and exercise regimen to promote optimal glycemic control    2. Severe opioid use disorder (HCC)    - Follow with Dr. Divya Keene as previously scheduled    3. Chronic pain syndrome    - gabapentin (NEURONTIN) 800 MG tablet; Take 1 tablet by mouth 3 times daily for 90 days. Dispense: 90 tablet; Refill: 2  - OARRS reviewed- no discrepancies    4. Nausea    - promethazine (PHENERGAN) 25 MG tablet; Take 1 tablet by mouth every 6 hours as needed for Nausea  Dispense: 15 tablet; Refill: 0    5. COPD exacerbation (HCC)    - Declines smoking cessation at this time    6. Insomnia, unspecified type    - QUEtiapine (SEROQUEL) 100 MG tablet; Take 1 tablet by mouth daily  Dispense: 30 tablet; Refill: 3  - QUEtiapine (SEROQUEL) 300 MG tablet; Take 1 tablet by mouth nightly  Dispense: 30 tablet; Refill: 3  - Increase Seroquel     7. Depression, unspecified depression type    - venlafaxine (EFFEXOR) 75 MG tablet; Take 3 tablets by mouth daily  Dispense: 30 tablet; Refill: 3      Follow up in 6 weeks    An electronic signature was used to authenticate this note.     --YANNICK Meng - CNP on 2/3/2020 at 8:15 AM

## 2020-01-31 ASSESSMENT — ENCOUNTER SYMPTOMS
SHORTNESS OF BREATH: 0
COUGH: 0
WHEEZING: 0
ABDOMINAL DISTENTION: 0
SINUS PRESSURE: 0
CONSTIPATION: 0
RHINORRHEA: 0
ABDOMINAL PAIN: 0
NAUSEA: 0
TROUBLE SWALLOWING: 0
PHOTOPHOBIA: 0
DIARRHEA: 0
BLOOD IN STOOL: 0
SINUS PAIN: 0
VOMITING: 0
SORE THROAT: 0

## 2020-02-10 ENCOUNTER — OFFICE VISIT (OUTPATIENT)
Dept: INTERNAL MEDICINE CLINIC | Age: 40
End: 2020-02-10
Payer: MEDICARE

## 2020-02-10 VITALS
WEIGHT: 93.4 LBS | BODY MASS INDEX: 15.01 KG/M2 | HEART RATE: 101 BPM | DIASTOLIC BLOOD PRESSURE: 82 MMHG | HEIGHT: 66 IN | SYSTOLIC BLOOD PRESSURE: 136 MMHG

## 2020-02-10 PROCEDURE — G8427 DOCREV CUR MEDS BY ELIG CLIN: HCPCS | Performed by: INTERNAL MEDICINE

## 2020-02-10 PROCEDURE — 99213 OFFICE O/P EST LOW 20 MIN: CPT | Performed by: INTERNAL MEDICINE

## 2020-02-10 PROCEDURE — 4004F PT TOBACCO SCREEN RCVD TLK: CPT | Performed by: INTERNAL MEDICINE

## 2020-02-10 PROCEDURE — G8484 FLU IMMUNIZE NO ADMIN: HCPCS | Performed by: INTERNAL MEDICINE

## 2020-02-10 PROCEDURE — G8418 CALC BMI BLW LOW PARAM F/U: HCPCS | Performed by: INTERNAL MEDICINE

## 2020-02-10 RX ORDER — PROMETHAZINE HYDROCHLORIDE 25 MG/1
25 TABLET ORAL EVERY 6 HOURS PRN
Qty: 15 TABLET | Refills: 0 | Status: SHIPPED | OUTPATIENT
Start: 2020-02-10

## 2020-02-10 RX ORDER — BUPRENORPHINE AND NALOXONE 8; 2 MG/1; MG/1
1 FILM, SOLUBLE BUCCAL; SUBLINGUAL 2 TIMES DAILY
Qty: 56 FILM | Refills: 0 | Status: SHIPPED | OUTPATIENT
Start: 2020-02-10 | End: 2020-03-09 | Stop reason: SDUPTHER

## 2020-02-10 NOTE — PROGRESS NOTES
[facility] PROFESSIONAL SERVS  PHYSICIANS [facility] [address] [employer]RICK BASS.PADDY, Dimitrios Brown  Dept: [phone]  Dept Fax: [phone]      Chief Complaint   Patient presents with    Drug Problem       Patient presents for evaluation of opioid use. I saw her 1/13  She saw Dr. Esau Magaña   This patient is followed regularly by  Marjorie Kohler CNP. Patient said she is followed for the last 6 months at the [address] [place] [address] clinic by Marjorie Kohler. She gives her Suboxone as well as her psych medications. She says she wants to come here because she was told she had to take the Suboxone shot. She had no choice. She started using pain pills at age 23. She was taking up to 10 Percocets daily. History of cocaine, none recently. She's been on Suboxone several times, but relapsed. Most recently after an inpatient stay          Past Medical History:   Diagnosis Date    [facility]) [facility]     Diabetes mellitus ([facility]     Drug addiction ([facility]     HPV (human papilloma virus) infection     Hypertension        Current Outpatient Medications   Medication Sig Dispense Refill    buprenorphine-naloxone (SUBOXONE) 8-2 MG FILM SL film Place 1 Film under the tongue 2 times daily for 28 days. 56 Film 0    promethazine (PHENERGAN) 25 MG tablet Take 1 tablet by mouth every 6 hours as needed for Nausea 15 tablet 0    QUEtiapine (SEROQUEL) 100 MG tablet Take 1 tablet by mouth daily 30 tablet 3    QUEtiapine (SEROQUEL) 300 MG tablet Take 1 tablet by mouth nightly 30 tablet 3    venlafaxine (EFFEXOR) 75 MG tablet Take 3 tablets by mouth daily 30 tablet 3    glimepiride (AMARYL) 2 MG tablet Take 2 tablets by mouth every morning (before breakfast) 30 tablet 3    gabapentin (NEURONTIN) 800 MG tablet Take 1 tablet by mouth 3 times daily for 90 days. 90 tablet 2    buprenorphine-naloxone (SUBOXONE) 2-0.5 MG FILM SL film Place 1 Film under the tongue daily.  Once in afternoon      therapy     6. Bipolar 1 disorder ([facility]         No orders of the defined types were placed in this encounter. I told the patient here she is not forced to take the shot. It is available if she would like to pursue it. She is on 8 mg twice daily. She has a history of psychiatric issues including bipolar.   .  She also has chronic pain syndrome and is on Neurontin  Patient saw Dr. Jose Dominique a couple times     Comprehensive urine showed no inconsistencies last visit  I reviewed the [facility] report     There does not appear to be any discrepancies or overprescribing of controlled substances         follow-up 4 weeks

## 2020-02-25 ENCOUNTER — TELEPHONE (OUTPATIENT)
Dept: INTERNAL MEDICINE CLINIC | Age: 40
End: 2020-02-25

## 2020-03-09 ENCOUNTER — OFFICE VISIT (OUTPATIENT)
Dept: INTERNAL MEDICINE CLINIC | Age: 40
End: 2020-03-09
Payer: MEDICARE

## 2020-03-09 VITALS
BODY MASS INDEX: 32.3 KG/M2 | SYSTOLIC BLOOD PRESSURE: 125 MMHG | WEIGHT: 201 LBS | HEART RATE: 108 BPM | HEIGHT: 66 IN | DIASTOLIC BLOOD PRESSURE: 86 MMHG

## 2020-03-09 PROCEDURE — 2022F DILAT RTA XM EVC RTNOPTHY: CPT | Performed by: INTERNAL MEDICINE

## 2020-03-09 PROCEDURE — 3046F HEMOGLOBIN A1C LEVEL >9.0%: CPT | Performed by: INTERNAL MEDICINE

## 2020-03-09 PROCEDURE — G8427 DOCREV CUR MEDS BY ELIG CLIN: HCPCS | Performed by: INTERNAL MEDICINE

## 2020-03-09 PROCEDURE — G8417 CALC BMI ABV UP PARAM F/U: HCPCS | Performed by: INTERNAL MEDICINE

## 2020-03-09 PROCEDURE — 99213 OFFICE O/P EST LOW 20 MIN: CPT | Performed by: INTERNAL MEDICINE

## 2020-03-09 PROCEDURE — 4004F PT TOBACCO SCREEN RCVD TLK: CPT | Performed by: INTERNAL MEDICINE

## 2020-03-09 PROCEDURE — 80305 DRUG TEST PRSMV DIR OPT OBS: CPT | Performed by: INTERNAL MEDICINE

## 2020-03-09 PROCEDURE — G8484 FLU IMMUNIZE NO ADMIN: HCPCS | Performed by: INTERNAL MEDICINE

## 2020-03-09 RX ORDER — BUPRENORPHINE AND NALOXONE 8; 2 MG/1; MG/1
1 FILM, SOLUBLE BUCCAL; SUBLINGUAL 2 TIMES DAILY
Qty: 56 FILM | Refills: 0 | Status: SHIPPED | OUTPATIENT
Start: 2020-03-09 | End: 2020-04-06

## 2020-03-16 ENCOUNTER — TELEPHONE (OUTPATIENT)
Dept: INTERNAL MEDICINE CLINIC | Age: 40
End: 2020-03-16

## 2020-03-16 NOTE — TELEPHONE ENCOUNTER
Per Dr. Amanda Jones request I called patient for a appointment for Tuesday 03 17 20. I left a message for patient to please come in on 03 17 20 and to bring her medication.

## 2020-03-24 ENCOUNTER — TELEPHONE (OUTPATIENT)
Dept: INTERNAL MEDICINE CLINIC | Age: 40
End: 2020-03-24

## 2020-03-24 NOTE — TELEPHONE ENCOUNTER
I called patient at # [phone] and her mother Fady Swanson answered (she is on patient's [employer]) and said that patient was not there. I gave Fady Swanson the number of the [phone] and told her to give it to the patient and to tell her to call asap. I also called  # [phone] and left a message that on behalf of Dr. Mariposa Machado the patient must come to the clinic no later that 5pm on Wed 03 25 20 and to bring her medication. If she fails to do this she will no longer receive Suboxone from this clinic.

## 2020-03-25 ENCOUNTER — NURSE ONLY (OUTPATIENT)
Dept: INTERNAL MEDICINE CLINIC | Age: 40
End: 2020-03-25
Payer: MEDICARE

## 2020-03-25 VITALS
SYSTOLIC BLOOD PRESSURE: 134 MMHG | HEIGHT: 66 IN | BODY MASS INDEX: 32.95 KG/M2 | TEMPERATURE: 98.5 F | WEIGHT: 205 LBS | DIASTOLIC BLOOD PRESSURE: 105 MMHG | HEART RATE: 136 BPM

## 2020-03-25 PROCEDURE — 80305 DRUG TEST PRSMV DIR OPT OBS: CPT | Performed by: INTERNAL MEDICINE

## 2020-03-25 NOTE — PROGRESS NOTES
Verbal order per Dr. Jacek Lainez for urine drug screen. Urine positive for BUP. Verified results with Malika Coates LPN. Film count 19. UC and oral swab sent.

## 2020-04-01 ENCOUNTER — TELEPHONE (OUTPATIENT)
Dept: INTERNAL MEDICINE CLINIC | Age: 40
End: 2020-04-01

## 2020-04-06 ENCOUNTER — VIRTUAL VISIT (OUTPATIENT)
Dept: INTERNAL MEDICINE CLINIC | Age: 40
End: 2020-04-06

## 2020-04-06 ENCOUNTER — TELEPHONE (OUTPATIENT)
Dept: INTERNAL MEDICINE CLINIC | Age: 40
End: 2020-04-06

## 2020-04-06 PROCEDURE — G8417 CALC BMI ABV UP PARAM F/U: HCPCS | Performed by: INTERNAL MEDICINE

## 2020-04-06 PROCEDURE — 4004F PT TOBACCO SCREEN RCVD TLK: CPT | Performed by: INTERNAL MEDICINE

## 2020-04-06 PROCEDURE — G8428 CUR MEDS NOT DOCUMENT: HCPCS | Performed by: INTERNAL MEDICINE

## 2020-04-06 RX ORDER — BUPRENORPHINE AND NALOXONE 8; 2 MG/1; MG/1
1 FILM, SOLUBLE BUCCAL; SUBLINGUAL 2 TIMES DAILY
Qty: 56 FILM | Refills: 0 | Status: SHIPPED | OUTPATIENT
Start: 2020-04-06 | End: 2020-05-04

## 2020-04-06 RX ORDER — PENICILLIN V POTASSIUM 500 MG/1
500 TABLET ORAL 4 TIMES DAILY
Qty: 40 TABLET | Refills: 0 | Status: SHIPPED | OUTPATIENT
Start: 2020-04-06 | End: 2020-04-16

## 2020-04-09 NOTE — PROGRESS NOTES
Kehinde Louis is a 42 y.o. female evaluated via telephone on 4/6/2020. Consent:  She and/or health care decision maker is aware that that she may receive a bill for this telephone service, depending on her insurance coverage, and has provided verbal consent to proceed: Yes      Documentation:  I communicated with the patient and/or health care decision maker about opioid use disorder. Patient said she is followed for the last 6 months at the [address] [place] [address] by Juliette Reyes. She gives her Suboxone as well as her psych medications. She says she wants to come here because she was told she had to take the Suboxone shot. She had no choice. She started using pain pills at age 23. She was taking up to 10 Percocets daily. History of cocaine, none recently. She's been on Suboxone several times, but relapsed. Most recently after an inpatient stay  There have been questions whether she sells her Suboxone  ROS  Patient is feeling well  Patient is not experiencing  withdrawal symptoms ,no urges or cravings  Patient is not having any side effects from the buprenorphine   Diagnosis Orders   1. Severe opioid use disorder (HCC)  buprenorphine-naloxone (SUBOXONE) 8-2 MG FILM SL film   2. Chronic pain syndrome     3. Encounter for monitoring Suboxone maintenance therapy           I affirm this is a Patient Initiated Episode with an Established Patient who has not had a related appointment within my department in the past 7 days or scheduled within the next 24 hours.     Total Time: minutes: <5 minutes (not billable)    Note: not billable if this call serves to triage the patient into an appointment for the relevant concern      Jolie Cox

## 2020-04-13 ENCOUNTER — TELEPHONE (OUTPATIENT)
Dept: INTERNAL MEDICINE CLINIC | Age: 40
End: 2020-04-13

## 2020-04-15 ENCOUNTER — TELEPHONE (OUTPATIENT)
Dept: INTERNAL MEDICINE CLINIC | Age: 40
End: 2020-04-15

## 2020-05-26 ENCOUNTER — HOSPITAL ENCOUNTER (OUTPATIENT)
Age: 40
Setting detail: SPECIMEN
Discharge: HOME | End: 2020-05-26
Payer: MEDICARE

## 2020-05-26 LAB
ALBUMIN SERPL-MCNC: 4.2 G/DL (ref 3.5–5.2)
ALBUMIN/GLOBULIN RATIO: 1.4 (ref 1–2.5)
ALP BLD-CCNC: 123 U/L (ref 35–104)
ALT SERPL-CCNC: 11 U/L (ref 5–33)
ANION GAP SERPL CALCULATED.3IONS-SCNC: 13 MMOL/L (ref 9–17)
AST SERPL-CCNC: 11 U/L
BILIRUB SERPL-MCNC: 0.21 MG/DL (ref 0.3–1.2)
BUN BLDV-MCNC: 8 MG/DL (ref 6–20)
BUN/CREAT BLD: ABNORMAL (ref 9–20)
CALCIUM SERPL-MCNC: 9.5 MG/DL (ref 8.6–10.4)
CHLORIDE BLD-SCNC: 96 MMOL/L (ref 98–107)
CHOLESTEROL/HDL RATIO: 8.3
CHOLESTEROL: 275 MG/DL
CO2: 29 MMOL/L (ref 20–31)
CREAT SERPL-MCNC: 0.51 MG/DL (ref 0.5–0.9)
GFR AFRICAN AMERICAN: >60 ML/MIN
GFR NON-AFRICAN AMERICAN: >60 ML/MIN
GFR SERPL CREATININE-BSD FRML MDRD: ABNORMAL ML/MIN/{1.73_M2}
GFR SERPL CREATININE-BSD FRML MDRD: ABNORMAL ML/MIN/{1.73_M2}
GLUCOSE BLD-MCNC: 392 MG/DL (ref 70–99)
HAV IGM SER IA-ACNC: NONREACTIVE
HDLC SERPL-MCNC: 33 MG/DL
HEPATITIS B CORE IGM ANTIBODY: NONREACTIVE
HEPATITIS B SURFACE ANTIGEN: NONREACTIVE
HEPATITIS C ANTIBODY: NONREACTIVE
LDL CHOLESTEROL DIRECT: 179 MG/DL
LDL CHOLESTEROL: ABNORMAL MG/DL (ref 0–130)
POTASSIUM SERPL-SCNC: 4.6 MMOL/L (ref 3.7–5.3)
SODIUM BLD-SCNC: 138 MMOL/L (ref 135–144)
TOTAL PROTEIN: 7.2 G/DL (ref 6.4–8.3)
TRIGL SERPL-MCNC: 435 MG/DL
TSH SERPL DL<=0.05 MIU/L-ACNC: 1.56 MIU/L (ref 0.3–5)
VLDLC SERPL CALC-MCNC: ABNORMAL MG/DL (ref 1–30)

## 2021-05-25 ENCOUNTER — HOSPITAL ENCOUNTER (EMERGENCY)
Age: 41
Discharge: HOME | End: 2021-05-26
Payer: MEDICARE

## 2021-05-25 ENCOUNTER — APPOINTMENT (OUTPATIENT)
Dept: CT IMAGING | Age: 41
End: 2021-05-25
Payer: MEDICARE

## 2021-05-25 VITALS
OXYGEN SATURATION: 98 % | RESPIRATION RATE: 18 BRPM | DIASTOLIC BLOOD PRESSURE: 93 MMHG | TEMPERATURE: 98.5 F | SYSTOLIC BLOOD PRESSURE: 140 MMHG | HEART RATE: 132 BPM

## 2021-05-25 DIAGNOSIS — S09.90XA CLOSED HEAD INJURY, INITIAL ENCOUNTER: Primary | ICD-10-CM
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
## 2021-05-25 PROCEDURE — 70450 CT HEAD/BRAIN W/O DYE: CPT

## 2021-05-25 PROCEDURE — 6370000000 HC RX 637 (ALT 250 FOR IP)

## 2021-05-25 PROCEDURE — 99283 EMERGENCY DEPT VISIT LOW MDM: CPT

## 2021-05-25 RX ORDER — ACETAMINOPHEN 325 MG/1
650 TABLET ORAL ONCE
Status: COMPLETED | OUTPATIENT
Start: 2021-05-25 | End: 2021-05-25

## 2021-05-25 RX ORDER — ACETAMINOPHEN 325 MG/1
TABLET ORAL
Status: DISCONTINUED
Start: 2021-05-25 | End: 2021-05-26 | Stop reason: HOSPADM

## 2021-05-25 RX ORDER — ACETAMINOPHEN 325 MG/1
TABLET ORAL
Status: COMPLETED
Start: 2021-05-25 | End: 2021-05-25

## 2021-05-25 RX ADMIN — ACETAMINOPHEN 650 MG: 325 TABLET ORAL at 23:20

## 2021-05-25 ASSESSMENT — PAIN DESCRIPTION - LOCATION: LOCATION: HEAD

## 2021-05-25 ASSESSMENT — PAIN DESCRIPTION - PAIN TYPE: TYPE: ACUTE PAIN

## 2021-05-26 NOTE — ED NOTES
Discharge instructions discussed and explained with Pt. Pt. Verbalized understanding and has no further questions or needs at this time.         Isacc Hayes RN  05/26/21 0001
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
## 2021-05-27 ASSESSMENT — ENCOUNTER SYMPTOMS
ABDOMINAL PAIN: 0
COUGH: 0
EYE REDNESS: 0
NAUSEA: 0
VOMITING: 0
SHORTNESS OF BREATH: 0
RHINORRHEA: 0
CHEST TIGHTNESS: 0
BACK PAIN: 0
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
## 2021-08-01 NOTE — PROGRESS NOTES
[facility] PROFESSIONAL SERVS  PHYSICIANS [place] [address] [address] [place] [address] OFFPATRICK BASS.PADDY, Dimitrios Brown  Dept: [phone]  Dept Fax: [phone]      Chief Complaint   Patient presents with    Drug Problem       Patient presents for evaluation of opioid use. I have never seen the patient before. This patient is followed regularly by  Dionne Huertas CNP. Patient said she is followed for the last 6 months at the [address] [place] [id number] clinic by Dionne Huertas. She gives her Suboxone as well as her psych medications. She says she wants to come here because she was told she had to take the Suboxone shot. She had no choice. She started using pain pills at age 23. She was taking up to 10 Percocets daily. History of cocaine, none recently. She's been on Suboxone several times, but relapsed. Most recently after an inpatient stay  Past Medical History:   Diagnosis Date    [facility]) [facility]     Diabetes mellitus ([facility]     Drug addiction ([facility]     HPV (human papilloma virus) infection        Current Outpatient Medications   Medication Sig Dispense Refill    promethazine (PHENERGAN) 25 MG tablet Take 25 mg by mouth every 6 hours as needed for Nausea      gabapentin (NEURONTIN) 800 MG tablet Take 1 tablet by mouth 3 times daily for 30 days. 90 tablet 0    insulin lispro (HUMALOG) 100 UNIT/ML injection vial Inject into the skin 3 times daily (before meals) sliding scale      venlafaxine (EFFEXOR) 37.5 MG tablet Take 37.5 mg by mouth daily      QUEtiapine (SEROQUEL XR) 200 MG extended release tablet Take 200 mg by mouth nightly      albuterol sulfate HFA (PROVENTIL HFA) 108 (90 Base) MCG/ACT inhaler Inhale 2 puffs into the lungs every 6 hours as needed for Wheezing 1 Inhaler 3    furosemide (LASIX) 40 MG tablet Take 1 tablet by mouth daily. 30 tablet 5    buprenorphine-naloxone (SUBOXONE) 8-2 MG FILM SL film Place 1 Film under the tongue 2 times daily .       QUEtiapine (SEROQUEL) 25 MG tablet Take 100 mg by mouth daily       ibuprofen (ADVIL;MOTRIN) 800 MG tablet Take 800 mg by mouth every 6 hours as needed.  ondansetron (ZOFRAN ODT) 4 MG disintegrating tablet Take 1 tablet by mouth every 8 hours as needed for Nausea 10 tablet 0     No current facility-administered medications for this visit.         Past Surgical History:   Procedure Laterality Date    AXILLARY SURGERY  left 2002, rt 2010    Sweat glands removed Dr. Beth Valentine biopsy; [facility]TER[facility]  2006    Dr. Ortiz [place]  2005    right foot    OTHER SURGICAL HISTORY  12/17/2012    excision of mons pubic mass-Dr. Gabriele Vaz       Allergies   Allergen Reactions    Ketorolac Tromethamine      headaches    Morphine Hives    Darvocet [Propoxyphene N-Acetaminophen] Nausea And Vomiting       Social History     Socioeconomic History    Marital status: Single     Spouse name: Not on file    Number of children: Not on file    Years of education: Not on file    Highest education level: Not on file   Occupational History    Not on file   Social Needs    Financial resource strain: Not on file    Food insecurity:     Worry: Not on file     Inability: Not on file    Transportation needs:     Medical: Not on file     Non-medical: Not on file   Tobacco Use    Smoking status: Current Every Day Smoker     Packs/day: 0.50     Years: 17.00     Pack years: 8.50     Types: Cigarettes    Smokeless tobacco: Never Used   Substance and Sexual Activity    Alcohol use: No    Drug use: Yes     Comment: Clean since Oct 30, 2013    Sexual activity: Yes     Partners: Male   Lifestyle    Physical activity:     Days per week: Not on file     Minutes per session: Not on file    Stress: Not on file   Relationships    Social connections:     Talks on phone: Not on file     Gets together: Not on file     Attends Mormon service: Not on file     Active member of club or organization: Not on file     Attends meetings of clubs or organizations: Not on file     Relationship status: Not on file    Intimate partner violence:     Fear of current or ex partner: Not on file     Emotionally abused: Not on file     Physically abused: Not on file     Forced sexual activity: Not on file   Other Topics Concern    Not on file   Social History Narrative    Not on file     1 daughter, in a group home  On disability  detention once, child endangerment, theft    Family History   Problem Relation Age of Onset    COPD Mother     High Blood Pressure Mother     Arthritis Mother     COPD Father     High Blood Pressure Father     Heart Disease Father     Stroke Father     Diabetes Maternal Aunt     Cancer Maternal Grandfather         Prostate       Review of Systems - General ROS: negative  Psychological ROS: negative  Hematological and Lymphatic ROS: negative  Respiratory ROS: no cough, shortness of breath, or wheezing  Cardiovascular ROS: no chest pain or dyspnea on exertion  Gastrointestinal ROS: no abdominal pain, change in bowel habits, or black or bloody stools  Genito-Urinary ROS: no dysuria, trouble voiding, or hematuria  Musculoskeletal ROS: negative  Neurological ROS: no TIA or stroke symptoms  Dermatological ROS: negative    Blood pressure (!) 151/99, pulse 92, height 5' 6\" (1.676 m), weight 217 lb (98.4 kg), not currently breastfeeding.     Physical Examination: General appearance - alert, well appearing, and in no distress  HEENT-head normocephalic, atraumatic  Mental status - alert, oriented to person, place, and time  Neck - supple, no significant adenopathy  Chest - clear to auscultation, no wheezes, rales or rhonchi, symmetric air entry  Heart - normal rate, regular rhythm, normal S1, S2, no murmurs, rubs, clicks or gallops  Abdomen - soft, nontender, nondistended, no masses or organomegaly  Neurological - alert, oriented, normal speech, no focal findings Incision & Drainage

## 2021-08-20 ENCOUNTER — HOSPITAL ENCOUNTER (OUTPATIENT)
Age: 41
Setting detail: SPECIMEN
Discharge: HOME | End: 2021-08-20
Payer: MEDICARE

## 2021-08-20 LAB
ALBUMIN SERPL-MCNC: 4.2 G/DL (ref 3.5–5.2)
ALBUMIN/GLOBULIN RATIO: 1.4 (ref 1–2.5)
ALP BLD-CCNC: 116 U/L (ref 35–104)
ALT SERPL-CCNC: 11 U/L (ref 5–33)
ANION GAP SERPL CALCULATED.3IONS-SCNC: 13 MMOL/L (ref 9–17)
AST SERPL-CCNC: 13 U/L
BILIRUB SERPL-MCNC: 0.36 MG/DL (ref 0.3–1.2)
BUN BLDV-MCNC: 9 MG/DL (ref 6–20)
BUN/CREAT BLD: ABNORMAL (ref 9–20)
CALCIUM SERPL-MCNC: 8.7 MG/DL (ref 8.6–10.4)
CHLORIDE BLD-SCNC: 97 MMOL/L (ref 98–107)
CHOLESTEROL/HDL RATIO: 7.3
CHOLESTEROL: 261 MG/DL
CO2: 26 MMOL/L (ref 20–31)
CREAT SERPL-MCNC: 0.57 MG/DL (ref 0.5–0.9)
GFR AFRICAN AMERICAN: >60 ML/MIN
GFR NON-AFRICAN AMERICAN: >60 ML/MIN
GFR SERPL CREATININE-BSD FRML MDRD: ABNORMAL ML/MIN/{1.73_M2}
GFR SERPL CREATININE-BSD FRML MDRD: ABNORMAL ML/MIN/{1.73_M2}
GLUCOSE BLD-MCNC: 307 MG/DL (ref 70–99)
HCT VFR BLD CALC: 43.8 % (ref 36.3–47.1)
HDLC SERPL-MCNC: 36 MG/DL
HEMOGLOBIN: 14 G/DL (ref 11.9–15.1)
LDL CHOLESTEROL: 174 MG/DL (ref 0–130)
MCH RBC QN AUTO: 29.3 PG (ref 25.2–33.5)
MCHC RBC AUTO-ENTMCNC: 32 G/DL (ref 28.4–34.8)
MCV RBC AUTO: 91.6 FL (ref 82.6–102.9)
NRBC AUTOMATED: 0 PER 100 WBC
PDW BLD-RTO: 13.1 % (ref 11.8–14.4)
PLATELET # BLD: 249 K/UL (ref 138–453)
PMV BLD AUTO: 10.6 FL (ref 8.1–13.5)
POTASSIUM SERPL-SCNC: 3.7 MMOL/L (ref 3.7–5.3)
RBC # BLD: 4.78 M/UL (ref 3.95–5.11)
SODIUM BLD-SCNC: 136 MMOL/L (ref 135–144)
TOTAL PROTEIN: 7.3 G/DL (ref 6.4–8.3)
TRIGL SERPL-MCNC: 253 MG/DL
TSH SERPL DL<=0.05 MIU/L-ACNC: 0.96 MIU/L (ref 0.3–5)
VLDLC SERPL CALC-MCNC: ABNORMAL MG/DL (ref 1–30)
WBC # BLD: 9.2 K/UL (ref 3.5–11.3)

## 2021-11-13 ENCOUNTER — HOSPITAL ENCOUNTER (EMERGENCY)
Age: 41
Discharge: HOME | End: 2021-11-13
Payer: MEDICARE

## 2021-11-13 VITALS
SYSTOLIC BLOOD PRESSURE: 143 MMHG | TEMPERATURE: 96.6 F | DIASTOLIC BLOOD PRESSURE: 92 MMHG | RESPIRATION RATE: 12 BRPM | BODY MASS INDEX: 28.93 KG/M2 | HEIGHT: 66 IN | HEART RATE: 107 BPM | WEIGHT: 180 LBS | OXYGEN SATURATION: 98 %

## 2021-11-13 DIAGNOSIS — E11.69 DIABETES MELLITUS TYPE 2 IN OBESE (HCC): ICD-10-CM

## 2021-11-13 DIAGNOSIS — R35.0 URINARY FREQUENCY: Primary | ICD-10-CM

## 2021-11-13 DIAGNOSIS — E66.9 DIABETES MELLITUS TYPE 2 IN OBESE (HCC): ICD-10-CM

## 2021-11-13 LAB
BILIRUBIN URINE: ABNORMAL
BLOOD, URINE: NEGATIVE
CHARACTER, URINE: ABNORMAL
CHP ED QC CHECK: YES
COLOR: ABNORMAL
GLUCOSE BLD-MCNC: 230 MG/DL
GLUCOSE BLD-MCNC: 230 MG/DL (ref 70–108)
GLUCOSE URINE: NEGATIVE MG/DL
KETONES, URINE: 15
LEUKOCYTE ESTERASE, URINE: ABNORMAL
NITRITE, URINE: NEGATIVE
PH, URINE: 5.5 (ref 5–9)
PROTEIN, URINE: >= 300 MG/DL
SPECIFIC GRAVITY, URINE: >= 1.03 (ref 1–1.03)
UROBILINOGEN, URINE: 2 EU/DL (ref 0.2–1)

## 2021-11-13 PROCEDURE — 87086 URINE CULTURE/COLONY COUNT: CPT

## 2021-11-13 PROCEDURE — 82948 REAGENT STRIP/BLOOD GLUCOSE: CPT

## 2021-11-13 PROCEDURE — 99213 OFFICE O/P EST LOW 20 MIN: CPT

## 2021-11-13 PROCEDURE — 81003 URINALYSIS AUTO W/O SCOPE: CPT

## 2021-11-13 PROCEDURE — 99214 OFFICE O/P EST MOD 30 MIN: CPT | Performed by: NURSE PRACTITIONER

## 2021-11-13 RX ORDER — SULFAMETHOXAZOLE AND TRIMETHOPRIM 800; 160 MG/1; MG/1
1 TABLET ORAL 2 TIMES DAILY
Qty: 10 TABLET | Refills: 0 | Status: SHIPPED | OUTPATIENT
Start: 2021-11-13 | End: 2021-11-18

## 2021-11-13 ASSESSMENT — ENCOUNTER SYMPTOMS
TROUBLE SWALLOWING: 0
ALLERGIC/IMMUNOLOGIC NEGATIVE: 1
EYE REDNESS: 0
VOMITING: 0
COUGH: 0
EYE PAIN: 0
DIARRHEA: 0
ABDOMINAL PAIN: 0
EYE DISCHARGE: 0
CONSTIPATION: 0
SHORTNESS OF BREATH: 0
NAUSEA: 0
BACK PAIN: 0
SORE THROAT: 0
RHINORRHEA: 0
WHEEZING: 0

## 2021-11-13 NOTE — ED TRIAGE NOTES
Pt to [facility] ambulatory with UTI s/s. This started yesterday. Pt was sent here from [facility] ([facility] ED.

## 2021-11-13 NOTE — ED PROVIDER NOTES
[facility]  Urgent Care Encounter      CHIEF COMPLAINT       Chief Complaint   Patient presents with    Urinary Tract Infection       Nurses Notes reviewed and I agree except as noted in the HPI. HISTORY OF PRESENT ILLNESS   Trice Garner is a 46 y.o. female who presents with several days of urinary frequency and left flank pain, believes she has UTI. Denies dysuria, fever, pelvic pressure, fatigue. Patient is diabetic. REVIEW OF SYSTEMS     Review of Systems   Constitutional: Negative for activity change, fatigue and fever. HENT: Negative for congestion, ear pain, rhinorrhea, sore throat and trouble swallowing. Eyes: Negative for pain, discharge and redness. Respiratory: Negative for cough, shortness of breath and wheezing. Cardiovascular: Negative. Gastrointestinal: Negative for abdominal pain, constipation, diarrhea, nausea and vomiting. Endocrine: Negative. Genitourinary: Positive for dysuria, frequency, pelvic pain and urgency. Musculoskeletal: Negative for arthralgias, back pain and myalgias. Skin: Negative for rash. Allergic/Immunologic: Negative. Neurological: Negative for dizziness, tremors, weakness and headaches. Hematological: Negative. Psychiatric/Behavioral: Negative for dysphoric mood and sleep disturbance. The patient is not nervous/anxious. PAST MEDICAL HISTORY         Diagnosis Date    [facility]chronic obstructive [facility] disease) [facility]     Diabetes mellitus ([facility]     Drug addiction ([facility]     HPV (human papilloma virus) infection     Hypertension        SURGICAL HISTORY     Patient  has a past surgical history that includes Foot surgery (); Dilation and curettage of uterus (); Cervix biopsy (); Axillary Surgery (left , rt );  section (); and other surgical history (2012).     CURRENT MEDICATIONS       Discharge Medication List as of 2021  2:37 PM      CONTINUE these medications which have NOT CHANGED    Details   promethazine (PHENERGAN) 25 MG tablet Take 1 tablet by mouth every 6 hours as needed for Nausea, Disp-15 tablet, R-0Normal      !! QUEtiapine (SEROQUEL) 100 MG tablet Take 1 tablet by mouth daily, Disp-30 tablet, R-3Normal      !! QUEtiapine (SEROQUEL) 300 MG tablet Take 1 tablet by mouth nightly, Disp-30 tablet, R-3Normal      glimepiride (AMARYL) 2 MG tablet Take 2 tablets by mouth every morning (before breakfast), Disp-30 tablet, R-3Normal      buprenorphine-naloxone (SUBOXONE) 8-2 MG FILM SL film Place 1 Film under the tongue 2 times daily. Historical Med      miconazole (ZEASORB-AF) 2 % powder Apply topically 2 times daily. , Disp-45 g, R-1, Normal      nystatin (MYCOSTATIN) 873083 UNIT/GM powder Apply 3 times daily. , Disp-2 Bottle, R-0, Normal      tiZANidine (ZANAFLEX) 4 MG capsule Take 1 capsule by mouth 3 times daily as needed for Muscle spasms, Disp-24 capsule, R-0Print      gabapentin (NEURONTIN) 800 MG tablet Take 1 tablet by mouth 3 times daily for 90 days. , Disp-90 tablet, R-2Normal      Insulin Pen Needle 31G X 5 MM MISC DAILY Starting Wed 10/16/2019, Disp-100 each, R-3, Normal      albuterol sulfate HFA (PROVENTIL HFA) 108 (90 Base) MCG/ACT inhaler Inhale 2 puffs into the lungs every 6 hours as needed for Wheezing, Disp-1 Inhaler, R-3Print      ibuprofen (ADVIL;MOTRIN) 800 MG tablet Take 800 mg by mouth every 6 hours as needed. !! - Potential duplicate medications found. Please discuss with provider. ALLERGIES     Patient is is allergic to ketorolac tromethamine, morphine, and darvocet [propoxyphene n-acetaminophen]. FAMILY HISTORY     Patient'sfamily history includes Arthritis in her mother; COPD in her father and mother; Cancer in her maternal grandfather; Diabetes in her maternal aunt; Heart Disease in her father; High Blood Pressure in her father and mother; Stroke in her father.     SOCIAL HISTORY     Patient  reports that she has been smoking cigarettes. She has a 17.00 pack-year smoking history. She has never used smokeless tobacco. She reports previous drug use. She reports that she does not drink alcohol. PHYSICAL EXAM     ED TRIAGE VITALS  BP: (!) 143/92, Temp: 96.6 °F (35.9 °C), Pulse: 107, Resp: 12, SpO2: 98 %  Physical Exam  Constitutional:       General: She is not in acute distress. Appearance: She is well-developed. She is not diaphoretic. HENT:      Right Ear: External ear normal.      Left Ear: External ear normal.      Nose: Nose normal.   Eyes:      General:         Right eye: No discharge. Left eye: No discharge. Conjunctiva/sclera: Conjunctivae normal.      Pupils: Pupils are equal, round, and reactive to light. Neck:      Vascular: No JVD. Cardiovascular:      Rate and Rhythm: Normal rate and regular rhythm. Pulmonary:      Effort: Pulmonary effort is normal. No respiratory distress. Musculoskeletal:         General: No tenderness or deformity. Normal range of motion. Cervical back: Normal range of motion. Skin:     General: Skin is warm and dry. Capillary Refill: Capillary refill takes less than 2 seconds. Coloration: Skin is not pale. Findings: No erythema or rash. Neurological:      Mental Status: She is alert and oriented to person, place, and time. Coordination: Coordination normal.   Psychiatric:         Behavior: Behavior normal.         Thought Content: Thought content normal.         Judgment: Judgment normal.         DIAGNOSTIC RESULTS   Labs:   Results for orders placed or performed during the hospital encounter of 11/13/21   Culture, Urine    Specimen: Urine   Result Value Ref Range    Urine Culture, Routine (A)      Growth of Contaminants. The mixture of organisms present are not a common cause of urinary tract infections and probably represent skin aydin or distal urethral aydin.      Organism Growth of Contaminants (A)    Urinalysis   Result Value Ref Range    Glucose, Ur Negative NEGATIVE mg/dl    Bilirubin Urine Moderate (A) NEGATIVE    Ketones, Urine 15 NEGATIVE    Specific Gravity, Urine >= 1.030 1.002 - 1.030    Blood, Urine Negative NEGATIVE    pH, Urine 5.50 5.0 - 9.0    Protein, Urine >= 300 (A) NEGATIVE mg/dl    Urobilinogen, Urine 2.00 0.2 - 1.0 eu/dl    Nitrite, Urine Negative NEGATIVE    Leukocyte Esterase, Urine Trace (A) NEGATIVE    Color, UA Orange STRAW-YELLOW    Character, Urine Cloudy (A) CLEAR-SL CLOUD   POCT glucose   Result Value Ref Range    Glucose 230 mg/dL    QC OK? yes    POCT Glucose   Result Value Ref Range    POC Glucose 230 (H) 70 - 108 mg/dl       IMAGING:    URGENT CARE COURSE:     Vitals:    11/13/21 1403   BP: (!) 143/92   Pulse: 107   Resp: 12   Temp: 96.6 °F (35.9 °C)   TempSrc: Temporal   SpO2: 98%   Weight: 180 lb (81.6 kg)   Height: 5' 6\" (1.676 m)     Urinalysis is completed to find patient only has trace leukocytes, protein is greater than 300, no blood, moderate bilirubin, no glucose, no nitrites. POCT glucose- 230    Medications - No data to display  PROCEDURES:  None  FINAL IMPRESSION      1. Urinary frequency    2. Diabetes mellitus type 2 in obese [facility])        DISPOSITION/PLAN   DISPOSITION      After reevaluation of the patient, it is decided that we will treat her empirically for cystitis and she can follow-up with her PCP next week. The sepsis risk score is triggered but this is irrelevant for this case. Patient is stable, afebrile.     PATIENT REFERRED TO:  YANNICK Ortiz - [facility]  [phone]    In 3 days  As needed    DISCHARGE MEDICATIONS:  Discharge Medication List as of 11/13/2021  2:37 PM      START taking these medications    Details   sulfamethoxazole-trimethoprim (BACTRIM DS) 800-160 MG per tablet Take 1 tablet by mouth 2 times daily for 5 days, Disp-10 tablet, R-0Normal           Discharge Medication List as of 11/13/2021  2:37 PM          YANNICK Stokes - TADEO Briceno, APRN - CNP  11/13/21 Monika, APRN - CNP  11/17/21 [id number]

## 2021-11-14 ENCOUNTER — HOSPITAL ENCOUNTER (EMERGENCY)
Age: 41
Discharge: HOME | End: 2021-11-15
Attending: EMERGENCY MEDICINE
Payer: MEDICARE

## 2021-11-14 VITALS
HEART RATE: 62 BPM | OXYGEN SATURATION: 96 % | HEIGHT: 65 IN | WEIGHT: 180 LBS | RESPIRATION RATE: 16 BRPM | SYSTOLIC BLOOD PRESSURE: 152 MMHG | BODY MASS INDEX: 29.99 KG/M2 | TEMPERATURE: 98.3 F | DIASTOLIC BLOOD PRESSURE: 79 MMHG

## 2021-11-14 DIAGNOSIS — A08.4 VIRAL GASTROENTERITIS: Primary | ICD-10-CM

## 2021-11-14 DIAGNOSIS — E11.69 DIABETES MELLITUS TYPE 2 IN OBESE (HCC): ICD-10-CM

## 2021-11-14 DIAGNOSIS — E86.0 DEHYDRATION: ICD-10-CM

## 2021-11-14 DIAGNOSIS — E66.9 DIABETES MELLITUS TYPE 2 IN OBESE (HCC): ICD-10-CM

## 2021-11-14 LAB
AMPHETAMINE+METHAMPHETAMINE URINE SCREEN: NEGATIVE
BARBITURATE QUANTITATIVE URINE: NEGATIVE
BASOPHILS # BLD: 0.4 %
BASOPHILS ABSOLUTE: 0.1 THOU/MM3 (ref 0–0.1)
BENZODIAZEPINE QUANTITATIVE URINE: NEGATIVE
CANNABINOID QUANTITATIVE URINE: NEGATIVE
COCAINE METABOLITE QUANTITATIVE URINE: NEGATIVE
EOSINOPHIL # BLD: 0 %
EOSINOPHILS ABSOLUTE: 0 THOU/MM3 (ref 0–0.4)
ERYTHROCYTE [DISTWIDTH] IN BLOOD BY AUTOMATED COUNT: 12.6 % (ref 11.5–14.5)
ERYTHROCYTE [DISTWIDTH] IN BLOOD BY AUTOMATED COUNT: 40.7 FL (ref 35–45)
HCT VFR BLD CALC: 45.4 % (ref 37–47)
HEMOGLOBIN: 15.3 GM/DL (ref 12–16)
ICTOTEST: NEGATIVE
IMMATURE GRANS (ABS): 0.06 THOU/MM3 (ref 0–0.07)
IMMATURE GRANULOCYTES: 0.4 %
LYMPHOCYTES # BLD: 10.6 %
LYMPHOCYTES ABSOLUTE: 1.8 THOU/MM3 (ref 1–4.8)
MCH RBC QN AUTO: 29.5 PG (ref 26–33)
MCHC RBC AUTO-ENTMCNC: 33.7 GM/DL (ref 32.2–35.5)
MCV RBC AUTO: 87.6 FL (ref 81–99)
MONOCYTES # BLD: 5.5 %
MONOCYTES ABSOLUTE: 0.9 THOU/MM3 (ref 0.4–1.3)
NUCLEATED RED BLOOD CELLS: 0 /100 WBC
OPIATES, URINE: NEGATIVE
ORGANISM: ABNORMAL
OXYCODONE: NEGATIVE
PHENCYCLIDINE QUANTITATIVE URINE: NEGATIVE
PLATELET # BLD: 461 THOU/MM3 (ref 130–400)
PMV BLD AUTO: 9.6 FL (ref 9.4–12.4)
RBC # BLD: 5.18 MILL/MM3 (ref 4.2–5.4)
SARS-COV-2, NAAT: NOT  DETECTED
SEG NEUTROPHILS: 83.1 %
SEGMENTED NEUTROPHILS ABSOLUTE COUNT: 13.9 THOU/MM3 (ref 1.8–7.7)
TROPONIN T: < 0.01 NG/ML
TSH SERPL DL<=0.05 MIU/L-ACNC: 0.28 UIU/ML (ref 0.4–4.2)
URINE CULTURE, ROUTINE: ABNORMAL
WBC # BLD: 16.7 THOU/MM3 (ref 4.8–10.8)

## 2021-11-14 PROCEDURE — 85025 COMPLETE CBC W/AUTO DIFF WBC: CPT

## 2021-11-14 PROCEDURE — 82077 ASSAY SPEC XCP UR&BREATH IA: CPT

## 2021-11-14 PROCEDURE — 2580000003 HC RX 258: Performed by: EMERGENCY MEDICINE

## 2021-11-14 PROCEDURE — 87635 SARS-COV-2 COVID-19 AMP PRB: CPT

## 2021-11-14 PROCEDURE — 80307 DRUG TEST PRSMV CHEM ANLYZR: CPT

## 2021-11-14 PROCEDURE — 96361 HYDRATE IV INFUSION ADD-ON: CPT

## 2021-11-14 PROCEDURE — 84443 ASSAY THYROID STIM HORMONE: CPT

## 2021-11-14 PROCEDURE — 82248 BILIRUBIN DIRECT: CPT

## 2021-11-14 PROCEDURE — 96374 THER/PROPH/DIAG INJ IV PUSH: CPT

## 2021-11-14 PROCEDURE — 80053 COMPREHEN METABOLIC PANEL: CPT

## 2021-11-14 PROCEDURE — 83735 ASSAY OF MAGNESIUM: CPT

## 2021-11-14 PROCEDURE — 83690 ASSAY OF LIPASE: CPT

## 2021-11-14 PROCEDURE — C9113 INJ PANTOPRAZOLE SODIUM, VIA: HCPCS | Performed by: EMERGENCY MEDICINE

## 2021-11-14 PROCEDURE — 6360000002 HC RX W HCPCS: Performed by: EMERGENCY MEDICINE

## 2021-11-14 PROCEDURE — 84484 ASSAY OF TROPONIN QUANT: CPT

## 2021-11-14 PROCEDURE — 81001 URINALYSIS AUTO W/SCOPE: CPT

## 2021-11-14 PROCEDURE — 99283 EMERGENCY DEPT VISIT LOW MDM: CPT

## 2021-11-14 PROCEDURE — 96375 TX/PRO/DX INJ NEW DRUG ADDON: CPT

## 2021-11-14 PROCEDURE — 87086 URINE CULTURE/COLONY COUNT: CPT

## 2021-11-14 PROCEDURE — 36415 COLL VENOUS BLD VENIPUNCTURE: CPT

## 2021-11-14 PROCEDURE — 93005 ELECTROCARDIOGRAM TRACING: CPT | Performed by: EMERGENCY MEDICINE

## 2021-11-14 RX ORDER — PANTOPRAZOLE SODIUM 40 MG/10ML
40 INJECTION, POWDER, LYOPHILIZED, FOR SOLUTION INTRAVENOUS ONCE
Status: COMPLETED | OUTPATIENT
Start: 2021-11-14 | End: 2021-11-14

## 2021-11-14 RX ORDER — ONDANSETRON 2 MG/ML
4 INJECTION INTRAMUSCULAR; INTRAVENOUS ONCE
Status: COMPLETED | OUTPATIENT
Start: 2021-11-14 | End: 2021-11-14

## 2021-11-14 RX ORDER — SODIUM CHLORIDE 9 MG/ML
10 INJECTION INTRAVENOUS ONCE
Status: COMPLETED | OUTPATIENT
Start: 2021-11-14 | End: 2021-11-14

## 2021-11-14 RX ORDER — 0.9 % SODIUM CHLORIDE 0.9 %
1000 INTRAVENOUS SOLUTION INTRAVENOUS ONCE
Status: COMPLETED | OUTPATIENT
Start: 2021-11-14 | End: 2021-11-15

## 2021-11-14 RX ADMIN — SODIUM CHLORIDE 1000 ML: 9 INJECTION, SOLUTION INTRAVENOUS at 22:55

## 2021-11-14 RX ADMIN — PANTOPRAZOLE SODIUM 40 MG: 40 INJECTION, POWDER, FOR SOLUTION INTRAVENOUS at 22:52

## 2021-11-14 RX ADMIN — SODIUM CHLORIDE, PRESERVATIVE FREE 10 ML: 5 INJECTION INTRAVENOUS at 22:52

## 2021-11-14 RX ADMIN — ONDANSETRON 4 MG: 2 INJECTION INTRAMUSCULAR; INTRAVENOUS at 22:53

## 2021-11-14 ASSESSMENT — ENCOUNTER SYMPTOMS
EYE ITCHING: 0
COUGH: 0
VOMITING: 1
CONSTIPATION: 0
RHINORRHEA: 0
NAUSEA: 1
DIARRHEA: 0
WHEEZING: 0
ABDOMINAL DISTENTION: 0
EYE PAIN: 0
EYE DISCHARGE: 0
BACK PAIN: 0
PHOTOPHOBIA: 0
CHOKING: 0
ABDOMINAL PAIN: 0
SINUS PRESSURE: 0
SORE THROAT: 0
SHORTNESS OF BREATH: 0
EYE REDNESS: 0
BLOOD IN STOOL: 0
TROUBLE SWALLOWING: 0
CHEST TIGHTNESS: 0
VOICE CHANGE: 0

## 2021-11-15 LAB
ALBUMIN SERPL-MCNC: 4.8 G/DL (ref 3.5–5.1)
ALP BLD-CCNC: 119 U/L (ref 38–126)
ALT SERPL-CCNC: 10 U/L (ref 11–66)
ANION GAP SERPL CALCULATED.3IONS-SCNC: 17 MEQ/L (ref 8–16)
AST SERPL-CCNC: 11 U/L (ref 5–40)
BACTERIA: ABNORMAL /HPF
BILIRUB SERPL-MCNC: 0.5 MG/DL (ref 0.3–1.2)
BILIRUBIN DIRECT: < 0.2 MG/DL (ref 0–0.3)
BILIRUBIN URINE: ABNORMAL
BLOOD, URINE: ABNORMAL
BUN BLDV-MCNC: 25 MG/DL (ref 7–22)
CALCIUM SERPL-MCNC: 10.4 MG/DL (ref 8.5–10.5)
CASTS 2: ABNORMAL /LPF
CASTS UA: ABNORMAL /LPF
CHARACTER, URINE: ABNORMAL
CHLORIDE BLD-SCNC: 93 MEQ/L (ref 98–111)
CO2: 28 MEQ/L (ref 23–33)
COLOR: ABNORMAL
CREAT SERPL-MCNC: 0.6 MG/DL (ref 0.4–1.2)
CRYSTALS, UA: ABNORMAL
EKG ATRIAL RATE: 62 BPM
EKG P AXIS: 66 DEGREES
EKG P-R INTERVAL: 162 MS
EKG Q-T INTERVAL: 418 MS
EKG QRS DURATION: 98 MS
EKG QTC CALCULATION (BAZETT): 424 MS
EKG R AXIS: 11 DEGREES
EKG T AXIS: 32 DEGREES
EKG VENTRICULAR RATE: 62 BPM
EPITHELIAL CELLS, UA: ABNORMAL /HPF
ETHYL ALCOHOL, SERUM: < 0.01 %
GFR SERPL CREATININE-BSD FRML MDRD: > 90 ML/MIN/1.73M2
GLUCOSE BLD-MCNC: 296 MG/DL (ref 70–108)
GLUCOSE URINE: >= 1000 MG/DL
KETONES, URINE: 80
LEUKOCYTE ESTERASE, URINE: NEGATIVE
LIPASE: 6.4 U/L (ref 5.6–51.3)
MAGNESIUM: 1.9 MG/DL (ref 1.6–2.4)
MISCELLANEOUS 2: ABNORMAL
NITRITE, URINE: NEGATIVE
OSMOLALITY CALCULATION: 291.1 MOSMOL/KG (ref 275–300)
PH UA: 5.5 (ref 5–9)
POTASSIUM SERPL-SCNC: 3.7 MEQ/L (ref 3.5–5.2)
PROTEIN UA: >= 300
RBC URINE: ABNORMAL /HPF
RENAL EPITHELIAL, UA: ABNORMAL
SODIUM BLD-SCNC: 138 MEQ/L (ref 135–145)
SPECIFIC GRAVITY, URINE: > 1.03 (ref 1–1.03)
TOTAL PROTEIN: 8.8 G/DL (ref 6.1–8)
UROBILINOGEN, URINE: 1 EU/DL (ref 0–1)
WBC UA: ABNORMAL /HPF
YEAST: ABNORMAL

## 2021-11-15 PROCEDURE — 93010 ELECTROCARDIOGRAM REPORT: CPT | Performed by: INTERNAL MEDICINE

## 2021-11-15 PROCEDURE — 6370000000 HC RX 637 (ALT 250 FOR IP): Performed by: EMERGENCY MEDICINE

## 2021-11-15 PROCEDURE — 96372 THER/PROPH/DIAG INJ SC/IM: CPT

## 2021-11-15 RX ORDER — PANTOPRAZOLE SODIUM 40 MG/1
40 TABLET, DELAYED RELEASE ORAL
Qty: 30 TABLET | Refills: 0 | Status: SHIPPED | OUTPATIENT
Start: 2021-11-15

## 2021-11-15 RX ORDER — ONDANSETRON 4 MG/1
4 TABLET, FILM COATED ORAL 3 TIMES DAILY PRN
Qty: 15 TABLET | Refills: 0 | Status: SHIPPED | OUTPATIENT
Start: 2021-11-15

## 2021-11-15 RX ADMIN — Medication 4 UNITS: at 01:19

## 2021-11-15 NOTE — ED NOTES
Bed: 039A  Expected date:   Expected time:   Means of arrival:   Comments:  KATARZYNA Warner RN  11/14/21 0803

## 2021-11-15 NOTE — ED PROVIDER NOTES
[facility]  eMERGENCY dEPARTMENT eNCOUnter          CHIEF COMPLAINT       Chief Complaint   Patient presents with    Emesis       Nurses Notes reviewed and I agree except as noted in the HPI. HISTORY OF PRESENT ILLNESS    Trice Bruner is a 46 y.o. female who presents nausea and vomiting. Patient states this is been ongoing for 3 days. Patient states that she has not had any diarrhea. She denies any fevers. Patient has had no chest pain. Patient states she has had normal bowel movements. She is not having any difficulty urinating. No one in the house is sick. She denies being pregnant. Patient does have a history of narcotics abuse however she denies using any narcotics. Patient is currently on Suboxone. Currently the patient is resting comfortably on the cot no apparent distress no real pain just nausea. REVIEW OF SYSTEMS     Review of Systems   Constitutional: Negative for activity change, appetite change, diaphoresis, fatigue and unexpected weight change. HENT: Negative for congestion, ear discharge, ear pain, hearing loss, rhinorrhea, sinus pressure, sore throat, trouble swallowing and voice change. Eyes: Negative for photophobia, pain, discharge, redness and itching. Respiratory: Negative for cough, choking, chest tightness, shortness of breath and wheezing. Cardiovascular: Negative for chest pain, palpitations and leg swelling. Gastrointestinal: Positive for nausea and vomiting. Negative for abdominal distention, abdominal pain, blood in stool, constipation and diarrhea. Endocrine: Negative for polydipsia, polyphagia and polyuria. Genitourinary: Negative for decreased urine volume, difficulty urinating, dysuria, enuresis, frequency, hematuria and urgency. Musculoskeletal: Negative for arthralgias, back pain, gait problem, myalgias, neck pain and neck stiffness. Skin: Negative for pallor and rash. Allergic/Immunologic: Negative for immunocompromised state. Neurological: Negative for dizziness, tremors, seizures, syncope, facial asymmetry, weakness, light-headedness, numbness and headaches. Hematological: Negative for adenopathy. Does not bruise/bleed easily. Psychiatric/Behavioral: Negative for agitation, confusion, decreased concentration, hallucinations and suicidal ideas. The patient is not nervous/anxious. PAST MEDICAL HISTORY    has a past medical history of COPD (chronic obstructive pulmonary disease) ([facility] [address] Diabetes mellitus ([facility] [address] Drug addiction ([facility] HPV (human papilloma virus) infection, and Hypertension. SURGICAL HISTORY      has a past surgical history that includes Foot surgery (); Dilation and curettage of uterus (); Cervix biopsy (); Axillary Surgery (left , rt );  section (); and other surgical history (2012). CURRENT MEDICATIONS       Discharge Medication List as of 11/15/2021 12:51 AM      CONTINUE these medications which have NOT CHANGED    Details   sulfamethoxazole-trimethoprim (BACTRIM DS) 800-160 MG per tablet Take 1 tablet by mouth 2 times daily for 5 days, Disp-10 tablet, R-0Normal      promethazine (PHENERGAN) 25 MG tablet Take 1 tablet by mouth every 6 hours as needed for Nausea, Disp-15 tablet, R-0Normal      !! QUEtiapine (SEROQUEL) 100 MG tablet Take 1 tablet by mouth daily, Disp-30 tablet, R-3Normal      !! QUEtiapine (SEROQUEL) 300 MG tablet Take 1 tablet by mouth nightly, Disp-30 tablet, R-3Normal      gabapentin (NEURONTIN) 800 MG tablet Take 1 tablet by mouth 3 times daily for 90 days. , Disp-90 tablet, R-2Normal      buprenorphine-naloxone (SUBOXONE) 8-2 MG FILM SL film Place 1 Film under the tongue 2 times daily. Historical Med      miconazole (ZEASORB-AF) 2 % powder Apply topically 2 times daily. , Disp-45 g, R-1, Normal      nystatin (MYCOSTATIN) 635015 UNIT/GM powder Apply 3 times daily. , Disp-2 Bottle, R-0, Normal      Insulin Pen Needle 31G X 5 MM MISC DAILY Starting Wed 10/16/2019, Disp-100 each, R-3, Normal      albuterol sulfate HFA (PROVENTIL HFA) 108 (90 Base) MCG/ACT inhaler Inhale 2 puffs into the lungs every 6 hours as needed for Wheezing, Disp-1 Inhaler, R-3Print      ibuprofen (ADVIL;MOTRIN) 800 MG tablet Take 800 mg by mouth every 6 hours as needed. !! - Potential duplicate medications found. Please discuss with provider. ALLERGIES     is allergic to ketorolac tromethamine, morphine, and darvocet [propoxyphene n-acetaminophen]. FAMILY HISTORY     She indicated that her mother is alive. She indicated that her father is . She indicated that the status of her maternal grandfather is unknown. She indicated that the status of her maternal aunt is unknown.   family history includes Arthritis in her mother; COPD in her father and mother; Cancer in her maternal grandfather; Diabetes in her maternal aunt; Heart Disease in her father; High Blood Pressure in her father and mother; Stroke in her father. SOCIAL HISTORY      reports that she has been smoking cigarettes. She has a 17.00 pack-year smoking history. She has never used smokeless tobacco. She reports previous drug use. She reports that she does not drink alcohol. PHYSICAL EXAM     INITIAL VITALS:  height is 5' 5\" (1.651 m) and weight is 180 lb (81.6 kg). Her oral temperature is 98.3 °F (36.8 °C). Her blood pressure is 152/79 (abnormal) and her pulse is 62. Her respiration is 16 and oxygen saturation is 96%. Physical Exam  Vitals and nursing note reviewed. Constitutional:       General: She is not in acute distress. Appearance: She is well-developed. She is not diaphoretic. HENT:      Head: Normocephalic and atraumatic. Right Ear: External ear normal.      Left Ear: External ear normal.      Nose: Nose normal.      Mouth/Throat:      Mouth: Mucous membranes are moist.      Pharynx: No oropharyngeal exudate. Eyes:      General: No scleral icterus.         Right eye: No discharge. Left eye: No discharge. Conjunctiva/sclera: Conjunctivae normal.      Pupils: Pupils are equal, round, and reactive to light. Neck:      Thyroid: No thyromegaly. Vascular: No JVD. Trachea: No tracheal deviation. Cardiovascular:      Rate and Rhythm: Normal rate and regular rhythm. Pulses:           Carotid pulses are 2+ on the right side and 2+ on the left side. Radial pulses are 2+ on the right side and 2+ on the left side. Femoral pulses are 2+ on the right side and 2+ on the left side. Popliteal pulses are 2+ on the right side and 2+ on the left side. Dorsalis pedis pulses are 2+ on the right side and 2+ on the left side. Posterior tibial pulses are 2+ on the right side and 2+ on the left side. Heart sounds: Normal heart sounds, S1 normal and S2 normal. No murmur heard. No friction rub. No gallop. Pulmonary:      Effort: Pulmonary effort is normal.      Breath sounds: Normal breath sounds. No stridor. No decreased breath sounds, wheezing, rhonchi or rales. Chest:      Chest wall: No tenderness. Abdominal:      General: Bowel sounds are normal. There is no distension. Palpations: Abdomen is soft. There is no mass. Tenderness: There is no abdominal tenderness. There is no guarding or rebound. Hernia: No hernia is present. Musculoskeletal:         General: No tenderness. Normal range of motion. Cervical back: Normal range of motion and neck supple. Right lower leg: No edema. Left lower leg: No edema. Lymphadenopathy:      Cervical: No cervical adenopathy. Skin:     General: Skin is warm and dry. Capillary Refill: Capillary refill takes less than 2 seconds. Findings: No bruising, ecchymosis, lesion or rash. Neurological:      General: No focal deficit present. Mental Status: She is alert and oriented to person, place, and time. Mental status is at baseline.       Cranial Nerves: other components within normal limits   ANION GAP - Abnormal; Notable for the following components:    Anion Gap 17.0 (*)     All other components within normal limits   COVID-19, RAPID   CULTURE, REFLEXED, URINE   LIPASE   MAGNESIUM   TROPONIN   ETHANOL   URINE DRUG SCREEN   BILE ACIDS, TOTAL   GLOMERULAR FILTRATION RATE, ESTIMATED   OSMOLALITY       EMERGENCY DEPARTMENT COURSE:   Vitals:    Vitals:    11/14/21 2159 11/14/21 2331   BP: (!) 174/104 (!) 152/79   Pulse: 72 62   Resp: 22 16   Temp: 98.3 °F (36.8 °C)    TempSrc: Oral    SpO2: 100% 96%   Weight: 180 lb (81.6 kg)    Height: 5' 5\" (1.651 m)      Patient was assessed at bedside appropriate labs and imaging were ordered. Patient is only having vomiting. Here today I reviewed all her labs and imaging. All appear to be within normal limits. The patient does have hyperglycemia however she always has hyperglycemia as she appears to be a little dry. I had a long talk with her about taking her insulin as she is directed. She is instructed drink plenty water. She is given a prescription for Zofran for at home. This was discussed at bedside with the patient who understood and agreed with the plan. Patient is subsequently discharged home in stable condition. Patient has what appears to be a mild viral gastroenteritis. Patient has been given medications for this she is instructed to take it as prescribed. She is instructed stay well-hydrated drinking plenty of water. Patient is instructed to follow her diabetic medication profile as directed. She has been given directions for food she is instructed to follow this. She is instructed to follow-up with a primary care physician to do so within the next 1 to 2 days. She is instructed return to the nearest emergency room immediately for any new or worsening complaints. CRITICAL CARE:   None    CONSULTS:  None    PROCEDURES:  None    FINAL IMPRESSION      1. Viral gastroenteritis    2.  Dehydration

## 2021-11-15 NOTE — ED NOTES
Cab is called for pt at this time.       Kolby Reyna, RN  11/15/21 [id number] MARY ANN Tee RN  11/15/21 [id number]

## 2021-11-15 NOTE — ED NOTES
Pt is not dry heaving anymore. Pt stating that she still feels sick. Pt respers are regular and updated on POC. Call light within reach.       Sukumar Hunt RN  11/14/21 [id number]

## 2021-11-15 NOTE — ED NOTES
Critical lab value called from lab and Dr. Marc Christian updated.       Laurie Jara, RN  11/14/21 [id number]

## 2021-11-15 NOTE — ED TRIAGE NOTES
Pt comes to ED with complaints of vomitting for the past 3 days. Pt states she has not been able to eat or drink anything for the past 3 days and keep it down. Pt denies diarrhea, constipation or any other symptoms. Pt is dry heaving continuously on arrival.  Pt respers are regular and assessment is complete.

## 2021-11-16 LAB
ORGANISM: ABNORMAL
URINE CULTURE REFLEX: ABNORMAL

## 2022-08-09 ENCOUNTER — HOSPITAL ENCOUNTER (OUTPATIENT)
Age: 42
Setting detail: SPECIMEN
Discharge: HOME | End: 2022-08-09

## 2022-08-10 LAB
CANDIDA SPECIES, DNA PROBE: NEGATIVE
GARDNERELLA VAGINALIS, DNA PROBE: POSITIVE
SOURCE: ABNORMAL
TRICHOMONAS VAGINALIS DNA: NEGATIVE

## 2022-08-30 ENCOUNTER — HOSPITAL ENCOUNTER (OUTPATIENT)
Age: 42
Setting detail: SPECIMEN
Discharge: HOME | End: 2022-08-30

## 2022-08-31 LAB
CHLAMYDIA BY THIN PREP: NEGATIVE
N. GONORRHOEAE DNA, THIN PREP: NEGATIVE
SPECIMEN DESCRIPTION: NORMAL

## 2022-09-01 LAB
HPV SAMPLE: NORMAL
HPV, GENOTYPE 16: NOT DETECTED
HPV, GENOTYPE 18: NOT DETECTED
HPV, HIGH RISK OTHER: NOT DETECTED
HPV, INTERPRETATION: NORMAL
SPECIMEN DESCRIPTION: NORMAL

## 2022-09-12 LAB — CYTOLOGY REPORT: NORMAL

## 2022-10-31 ENCOUNTER — HOSPITAL ENCOUNTER (OUTPATIENT)
Age: 42
Setting detail: SPECIMEN
Discharge: HOME | End: 2022-10-31

## 2022-10-31 LAB
CANDIDA SPECIES, DNA PROBE: NEGATIVE
GARDNERELLA VAGINALIS, DNA PROBE: NEGATIVE
SOURCE: NORMAL
TRICHOMONAS VAGINALIS DNA: NEGATIVE

## 2023-01-23 ENCOUNTER — HOSPITAL ENCOUNTER (OUTPATIENT)
Age: 43
Setting detail: SPECIMEN
Discharge: HOME | End: 2023-01-23

## 2023-01-23 LAB
HCT VFR BLD CALC: 43.3 % (ref 36.3–47.1)
HEMOGLOBIN: 13.8 G/DL (ref 11.9–15.1)
MCH RBC QN AUTO: 29.1 PG (ref 25.2–33.5)
MCHC RBC AUTO-ENTMCNC: 31.9 G/DL (ref 28.4–34.8)
MCV RBC AUTO: 91.4 FL (ref 82.6–102.9)
NRBC AUTOMATED: 0 PER 100 WBC
PDW BLD-RTO: 12.5 % (ref 11.8–14.4)
PLATELET # BLD: 276 K/UL (ref 138–453)
PMV BLD AUTO: 10.7 FL (ref 8.1–13.5)
RBC # BLD: 4.74 M/UL (ref 3.95–5.11)
WBC # BLD: 12.1 K/UL (ref 3.5–11.3)

## 2023-01-24 LAB
ALBUMIN SERPL-MCNC: 4 G/DL (ref 3.5–5.2)
ALBUMIN/GLOBULIN RATIO: 1.1 (ref 1–2.5)
ALP BLD-CCNC: 140 U/L (ref 35–104)
ALT SERPL-CCNC: 16 U/L (ref 5–33)
ANION GAP SERPL CALCULATED.3IONS-SCNC: 14 MMOL/L (ref 9–17)
AST SERPL-CCNC: 14 U/L
BILIRUB SERPL-MCNC: 0.2 MG/DL (ref 0.3–1.2)
BUN BLDV-MCNC: 14 MG/DL (ref 6–20)
CALCIUM SERPL-MCNC: 9.5 MG/DL (ref 8.6–10.4)
CHLORIDE BLD-SCNC: 105 MMOL/L (ref 98–107)
CHOLESTEROL/HDL RATIO: 2.8
CHOLESTEROL: 107 MG/DL
CO2: 22 MMOL/L (ref 20–31)
CREAT SERPL-MCNC: 0.57 MG/DL (ref 0.5–0.9)
GFR SERPL CREATININE-BSD FRML MDRD: >60 ML/MIN/1.73M2
GLUCOSE BLD-MCNC: 297 MG/DL (ref 70–99)
HDLC SERPL-MCNC: 38 MG/DL
LDL CHOLESTEROL: 42 MG/DL (ref 0–130)
POTASSIUM SERPL-SCNC: 4.2 MMOL/L (ref 3.7–5.3)
SODIUM BLD-SCNC: 141 MMOL/L (ref 135–144)
TOTAL PROTEIN: 7.8 G/DL (ref 6.4–8.3)
TRIGL SERPL-MCNC: 136 MG/DL
TSH SERPL DL<=0.05 MIU/L-ACNC: 0.64 UIU/ML (ref 0.3–5)

## 2023-01-27 LAB
HEPATITIS C RNA PCR QUANT: NOT DETECTED
SOURCE: NORMAL

## 2023-08-22 ENCOUNTER — HOSPITAL ENCOUNTER (EMERGENCY)
Age: 43
Discharge: HOME | End: 2023-08-22
Payer: MEDICARE

## 2023-08-22 VITALS
WEIGHT: 243 LBS | SYSTOLIC BLOOD PRESSURE: 135 MMHG | OXYGEN SATURATION: 98 % | HEART RATE: 78 BPM | RESPIRATION RATE: 16 BRPM | HEIGHT: 67 IN | TEMPERATURE: 98.7 F | BODY MASS INDEX: 38.14 KG/M2 | DIASTOLIC BLOOD PRESSURE: 87 MMHG

## 2023-08-22 DIAGNOSIS — B37.2 CANDIDAL SKIN INFECTION: Primary | ICD-10-CM

## 2023-08-22 PROCEDURE — 99213 OFFICE O/P EST LOW 20 MIN: CPT

## 2023-08-22 RX ORDER — FLUCONAZOLE 150 MG/1
150 TABLET ORAL
Qty: 2 TABLET | Refills: 0 | Status: SHIPPED | OUTPATIENT
Start: 2023-08-22 | End: 2023-08-28

## 2023-08-22 ASSESSMENT — PAIN - FUNCTIONAL ASSESSMENT: PAIN_FUNCTIONAL_ASSESSMENT: NONE - DENIES PAIN

## 2023-08-22 NOTE — DISCHARGE INSTRUCTIONS
Wash with soap and water  Pat dry  Avoid excessive sweating  Nystain powder  Fluconazole as prescribed

## 2023-09-06 ENCOUNTER — HOSPITAL ENCOUNTER (OUTPATIENT)
Age: 43
Setting detail: SPECIMEN
Discharge: HOME | End: 2023-09-06

## 2023-09-06 LAB
CANDIDA SPECIES: NEGATIVE
GARDNERELLA VAGINALIS: POSITIVE
SOURCE: ABNORMAL
TRICHOMONAS: NEGATIVE

## 2023-10-06 ENCOUNTER — HOSPITAL ENCOUNTER (OUTPATIENT)
Age: 43
Setting detail: SPECIMEN
Discharge: HOME | End: 2023-10-06

## 2023-10-07 LAB
CANDIDA SPECIES: NEGATIVE
GARDNERELLA VAGINALIS: POSITIVE
SOURCE: ABNORMAL
TRICHOMONAS: NEGATIVE

## 2024-01-20 NOTE — PROGRESS NOTES
FAILED ATTEMPT:  Attempt to transfer patient, w/ 2 person assist w/ walker,  from bed to chair was unsuccessful.  Patient stood at bedside for only a few seconds before fatiguing and having to sit back down; pt couldn't coordinate his footing and is deconditioned from being in and out of hospital in recent months.       Paged team for PT/OT orders.    tablet 0    venlafaxine (EFFEXOR) 75 MG tablet Take 225 mg by mouth daily      QUEtiapine (SEROQUEL XR) 200 MG extended release tablet Take 1 tablet by mouth nightly 30 tablet 3    insulin lispro (HUMALOG) 100 UNIT/ML injection vial Inject 15 Units into the skin 3 times daily (before meals) Plus sliding scale      albuterol sulfate HFA (PROVENTIL HFA) 108 (90 Base) MCG/ACT inhaler Inhale 2 puffs into the lungs every 6 hours as needed for Wheezing 1 Inhaler 3    ibuprofen (ADVIL;MOTRIN) 800 MG tablet Take 800 mg by mouth every 6 hours as needed. No current facility-administered medications for this visit. Review of Systems - . She says she had some urges, but she thinks it  is from her anxiety    Blood pressure 139/74, pulse 98, height 5' 6\" (1.676 m), weight 216 lb (98 kg), not currently breastfeeding. Physical Examination: General appearance - alert, well appearing, and in no distress  HEENT-head normocephalic, atraumatic  Mental status - alert, oriented to person, place, and time    Urine tox screen today.   Amphetamine Screen, Urine 09/04/2019 10:05 AM Unknown   NEG    Barbiturate Screen, Urine 09/04/2019 10:05 AM Unknown   NEG    Benzodiazepine Screen, Urine 09/04/2019 10:05 AM Unknown   NEG    Buprenorphine Urine 09/04/2019 10:05 AM Unknown   POS    Cocaine Metabolite Screen, Urine 09/04/2019 10:05 AM Unknown   NEG    Gabapentin Screen, Urine 09/04/2019 10:05 AM Unknown   N/A    MDMA, Urine 09/04/2019 10:05 AM Unknown   N/A    Methamphetamine, Urine 09/04/2019 10:05 AM Unknown   NEG    Methadone Screen, Urine 09/04/2019 10:05 AM Unknown   NEG    Opiate Scrn, Ur 09/04/2019 10:05 AM Unknown   NEG    Oxycodone Screen, Ur 09/04/2019 10:05 AM Unknown   NEG    PCP Screen, Urine 09/04/2019 10:05 AM Unknown   NEG    Propoxyphene Screen, Urine 09/04/2019 10:05 AM Unknown   N/A    THC Screen, Urine 09/04/2019 10:05 AM Unknown   NEG    Tricyclic Antidepressants, Urine 09/04/2019 10:05 AM Unknown   N/A Diagnosis Orders   1. Severe opioid use disorder (HCC)  POCT Rapid Drug Screen    buprenorphine-naloxone (SUBOXONE) 8-2 MG FILM SL film    buprenorphine-naloxone (SUBOXONE) 2-0.5 MG FILM SL film    Buprenophine and Metabolites, Urine   2. Chronic pain syndrome     3. Encounter for monitoring Suboxone maintenance therapy     4. Bipolar 1 disorder ([facility]     5. Type 2 diabetes mellitus with hyperglycemia, with long-term current use of insulin ([facility])     6. Anxiety     7. Borderline personality disorder ([facility]         Orders Placed This Encounter   Procedures    Buprenophine and Metabolites, Urine     Standing Status:   Future     Standing Expiration Date:   9/4/2020    Buprenophine and Metabolites, Urine    POCT Rapid Drug Screen     I told the patient here she is not forced to take the shot. It is available if she would like to pursue it. She is on 8 mg twice daily. I added 2 mg several weeks  She has a history of psychiatric issues including bipolar.   .  She also has chronic pain syndrome and is on Neurontin  I  set her up to see Dr. Deja Nava    Urine buprenorphine and metabolite levels appropriate last visit we will repeat, will repeat         follow-up 3 weeks

## 2024-07-03 ENCOUNTER — HOSPITAL ENCOUNTER (EMERGENCY)
Age: 44
Discharge: HOME | End: 2024-07-03
Payer: MEDICARE

## 2024-07-03 ENCOUNTER — APPOINTMENT (OUTPATIENT)
Dept: GENERAL RADIOLOGY | Age: 44
End: 2024-07-03
Payer: MEDICARE

## 2024-07-03 VITALS
HEART RATE: 98 BPM | RESPIRATION RATE: 17 BRPM | TEMPERATURE: 98.5 F | OXYGEN SATURATION: 100 % | SYSTOLIC BLOOD PRESSURE: 149 MMHG | DIASTOLIC BLOOD PRESSURE: 92 MMHG

## 2024-07-03 DIAGNOSIS — N39.0 URINARY TRACT INFECTION WITH HEMATURIA, SITE UNSPECIFIED: ICD-10-CM

## 2024-07-03 DIAGNOSIS — M79.89 LEG SWELLING: Primary | ICD-10-CM

## 2024-07-03 DIAGNOSIS — R31.9 URINARY TRACT INFECTION WITH HEMATURIA, SITE UNSPECIFIED: ICD-10-CM

## 2024-07-03 LAB
ALBUMIN SERPL BCG-MCNC: 4.2 G/DL (ref 3.5–5.1)
ALP SERPL-CCNC: 93 U/L (ref 38–126)
ALT SERPL W/O P-5'-P-CCNC: 24 U/L (ref 11–66)
ANION GAP SERPL CALC-SCNC: 13 MEQ/L (ref 8–16)
AST SERPL-CCNC: 26 U/L (ref 5–40)
BACTERIA URNS QL MICRO: ABNORMAL /HPF
BASOPHILS ABSOLUTE: 0.1 THOU/MM3 (ref 0–0.1)
BASOPHILS NFR BLD AUTO: 1 %
BILIRUB SERPL-MCNC: 0.3 MG/DL (ref 0.3–1.2)
BILIRUB UR QL STRIP.AUTO: NEGATIVE
BUN SERPL-MCNC: 12 MG/DL (ref 7–22)
CALCIUM SERPL-MCNC: 9.8 MG/DL (ref 8.5–10.5)
CASTS #/AREA URNS LPF: ABNORMAL /LPF
CASTS 2: ABNORMAL /LPF
CHARACTER UR: ABNORMAL
CHLORIDE SERPL-SCNC: 98 MEQ/L (ref 98–111)
CO2 SERPL-SCNC: 28 MEQ/L (ref 23–33)
COLOR, UA: YELLOW
CREAT SERPL-MCNC: 0.5 MG/DL (ref 0.4–1.2)
CRYSTALS URNS MICRO: ABNORMAL
DEPRECATED RDW RBC AUTO: 41.1 FL (ref 35–45)
EOSINOPHIL NFR BLD AUTO: 3.3 %
EOSINOPHILS ABSOLUTE: 0.3 THOU/MM3 (ref 0–0.4)
EPITHELIAL CELLS, UA: ABNORMAL /HPF
ERYTHROCYTE [DISTWIDTH] IN BLOOD BY AUTOMATED COUNT: 12.8 % (ref 11.5–14.5)
GFR SERPL CREATININE-BSD FRML MDRD: > 90 ML/MIN/1.73M2
GLUCOSE SERPL-MCNC: 127 MG/DL (ref 70–108)
GLUCOSE UR QL STRIP.AUTO: NEGATIVE MG/DL
HCG UR QL: NEGATIVE
HCT VFR BLD AUTO: 42.1 % (ref 37–47)
HGB BLD-MCNC: 14 GM/DL (ref 12–16)
HGB UR QL STRIP.AUTO: ABNORMAL
IMM GRANULOCYTES # BLD AUTO: 0.02 THOU/MM3 (ref 0–0.07)
IMM GRANULOCYTES NFR BLD AUTO: 0.2 %
KETONES UR QL STRIP.AUTO: NEGATIVE
LYMPHOCYTES ABSOLUTE: 2.3 THOU/MM3 (ref 1–4.8)
LYMPHOCYTES NFR BLD AUTO: 28 %
MCH RBC QN AUTO: 29.1 PG (ref 26–33)
MCHC RBC AUTO-ENTMCNC: 33.3 GM/DL (ref 32.2–35.5)
MCV RBC AUTO: 87.5 FL (ref 81–99)
MISCELLANEOUS 2: ABNORMAL
MONOCYTES ABSOLUTE: 0.6 THOU/MM3 (ref 0.4–1.3)
MONOCYTES NFR BLD AUTO: 6.8 %
NEUTROPHILS ABSOLUTE: 5 THOU/MM3 (ref 1.8–7.7)
NEUTROPHILS NFR BLD AUTO: 60.7 %
NITRITE UR QL STRIP: NEGATIVE
NRBC BLD AUTO-RTO: 0 /100 WBC
NT-PROBNP SERPL IA-MCNC: 38.7 PG/ML (ref 0–124)
OSMOLALITY SERPL CALC.SUM OF ELEC: 278.9 MOSMOL/KG (ref 275–300)
PH UR STRIP.AUTO: 5.5 [PH] (ref 5–9)
PLATELET # BLD AUTO: 285 THOU/MM3 (ref 130–400)
PMV BLD AUTO: 9.6 FL (ref 9.4–12.4)
POTASSIUM SERPL-SCNC: 3.9 MEQ/L (ref 3.5–5.2)
PROT SERPL-MCNC: 7.3 G/DL (ref 6.1–8)
PROT UR STRIP.AUTO-MCNC: ABNORMAL MG/DL
RBC # BLD AUTO: 4.81 MILL/MM3 (ref 4.2–5.4)
RBC URINE: ABNORMAL /HPF
RENAL EPI CELLS #/AREA URNS HPF: ABNORMAL /[HPF]
SODIUM SERPL-SCNC: 139 MEQ/L (ref 135–145)
SP GR UR REFRACT.AUTO: 1.02 (ref 1–1.03)
UROBILINOGEN, URINE: 1 EU/DL (ref 0–1)
WBC # BLD AUTO: 8.3 THOU/MM3 (ref 4.8–10.8)
WBC #/AREA URNS HPF: ABNORMAL /HPF
WBC #/AREA URNS HPF: ABNORMAL /[HPF]
YEAST LIKE FUNGI URNS QL MICRO: ABNORMAL

## 2024-07-03 PROCEDURE — 81001 URINALYSIS AUTO W/SCOPE: CPT

## 2024-07-03 PROCEDURE — 6360000002 HC RX W HCPCS

## 2024-07-03 PROCEDURE — 99285 EMERGENCY DEPT VISIT HI MDM: CPT

## 2024-07-03 PROCEDURE — 93005 ELECTROCARDIOGRAM TRACING: CPT

## 2024-07-03 PROCEDURE — 81025 URINE PREGNANCY TEST: CPT

## 2024-07-03 PROCEDURE — 85025 COMPLETE CBC W/AUTO DIFF WBC: CPT

## 2024-07-03 PROCEDURE — 87086 URINE CULTURE/COLONY COUNT: CPT

## 2024-07-03 PROCEDURE — 83880 ASSAY OF NATRIURETIC PEPTIDE: CPT

## 2024-07-03 PROCEDURE — 36415 COLL VENOUS BLD VENIPUNCTURE: CPT

## 2024-07-03 PROCEDURE — 71045 X-RAY EXAM CHEST 1 VIEW: CPT

## 2024-07-03 PROCEDURE — 96374 THER/PROPH/DIAG INJ IV PUSH: CPT

## 2024-07-03 PROCEDURE — 80053 COMPREHEN METABOLIC PANEL: CPT

## 2024-07-03 PROCEDURE — 6370000000 HC RX 637 (ALT 250 FOR IP)

## 2024-07-03 RX ORDER — CEFUROXIME AXETIL 250 MG/1
500 TABLET ORAL ONCE
Status: COMPLETED | OUTPATIENT
Start: 2024-07-03 | End: 2024-07-03

## 2024-07-03 RX ORDER — CEFUROXIME AXETIL 500 MG/1
500 TABLET ORAL 2 TIMES DAILY
Qty: 14 TABLET | Refills: 0 | Status: SHIPPED | OUTPATIENT
Start: 2024-07-03 | End: 2024-07-10

## 2024-07-03 RX ORDER — FUROSEMIDE 10 MG/ML
40 INJECTION INTRAMUSCULAR; INTRAVENOUS ONCE
Status: COMPLETED | OUTPATIENT
Start: 2024-07-03 | End: 2024-07-03

## 2024-07-03 RX ADMIN — CEFUROXIME AXETIL 500 MG: 250 TABLET ORAL at 18:44

## 2024-07-03 RX ADMIN — FUROSEMIDE 40 MG: 10 INJECTION, SOLUTION INTRAMUSCULAR; INTRAVENOUS at 18:44

## 2024-07-03 ASSESSMENT — PAIN DESCRIPTION - ORIENTATION: ORIENTATION: LEFT;RIGHT

## 2024-07-03 ASSESSMENT — PAIN SCALES - GENERAL: PAINLEVEL_OUTOF10: 5

## 2024-07-03 ASSESSMENT — PAIN DESCRIPTION - LOCATION: LOCATION: LEG

## 2024-07-03 NOTE — ED PROVIDER NOTES
void          Current Antibiotics: not stated   CBC WITH AUTO DIFFERENTIAL   BRAIN NATRIURETIC PEPTIDE   PREGNANCY, URINE   ANION GAP   GLOMERULAR FILTRATION RATE, ESTIMATED   OSMOLALITY       (Any cultures that may have been sent were not resulted at the time of this patient visit)    MEDICAL DECISION MAKING / ED COURSE:       1) Number and Complexity of Problems            Problem List This Visit:         Chief Complaint   Patient presents with    Leg Swelling     BLE- pt not able to say for how long           Differential Diagnosis includes (but not limited to):  See MDM             Pertinent Comorbid Conditions:    See HPI    2)  Data Reviewed (none if left blank)          My Independent interpretations:     EKG:      See ED Course, otherwise N/A    Imaging: See ED Course, otherwise N/A    Labs:      See ED Course, otherwise N/A              External Documentation Reviewed:         Previous patient encounter documents & history available on EMR was reviewed             See Formal Diagnostic Results above for the lab and radiology tests and orders.    3)  Treatment and Disposition         ED Reassessment:  See ED Course         Case discussed with consulting clinician:  n/a         Shared Decision-Making was performed and disposition discussed with the        Patient/Family and questions answered            Summary of Patient Presentation:      MDM  /  ED Course as of 07/04/24 1614   Wed Jul 03, 2024   1749 EKG sinus rhythm, T wave inversions noted in V1, no specific ST changes noted, unchanged from prior studies, patient chest pain-free [NG]   1937 CBC unremarkable [NG]   1938 UA suggestive of UTI [NG]   1938 CMP unremarkable [NG]   1938 BNP WNL [NG]   1938 CXR:  IMPRESSION:  1. No acute findings. [NG]      ED Course User Index  [NG] Isauro Calderon MD     Vitals Reviewed:    Vitals:    07/03/24 1728 07/03/24 1840 07/03/24 1934 07/03/24 2030   BP: (!) 117/95 (!) 151/91 (!) 143/102 (!) 149/92   Pulse: 80 74 77

## 2024-07-03 NOTE — ED NOTES
Assumed care at this time. Pt resting in bed alert and stable at this time. Denies any needs. Vs assessed. RR easy and unlabored

## 2024-07-03 NOTE — ED NOTES
Pt and vs reassessed. RR unlabored. Pt resting in bed alert after ambulating to the bathroom. Medicated per MAR and updated on POC. Pt stable at this time

## 2024-07-03 NOTE — ED NOTES
Pt and vs reassessed. RR unlabored. Pt resting in bed alert and denies any needs. Pt stable at this time

## 2024-07-04 LAB
EKG ATRIAL RATE: 80 BPM
EKG P AXIS: 55 DEGREES
EKG P-R INTERVAL: 166 MS
EKG Q-T INTERVAL: 380 MS
EKG QRS DURATION: 100 MS
EKG QTC CALCULATION (BAZETT): 438 MS
EKG R AXIS: -19 DEGREES
EKG T AXIS: 25 DEGREES
EKG VENTRICULAR RATE: 80 BPM

## 2024-07-04 PROCEDURE — 93010 ELECTROCARDIOGRAM REPORT: CPT | Performed by: INTERNAL MEDICINE

## 2024-07-04 NOTE — DISCHARGE INSTRUCTIONS
Your leg swelling may be due to infection or extra fluid    We have provided you with an antibiotic    Please call your primary care physician for possible medication adjustment    Please return to the ER if you develop chest pain, shortness of breath, fevers, or any other concerns.  It was a pleasure to meet you, take care.

## 2024-07-05 LAB
BACTERIA UR CULT: ABNORMAL
ORGANISM: ABNORMAL

## 2024-08-30 ENCOUNTER — HOSPITAL ENCOUNTER (EMERGENCY)
Age: 44
Discharge: HOME | End: 2024-08-30
Payer: MEDICARE

## 2024-08-30 VITALS
OXYGEN SATURATION: 99 % | RESPIRATION RATE: 16 BRPM | DIASTOLIC BLOOD PRESSURE: 82 MMHG | HEART RATE: 79 BPM | TEMPERATURE: 98.6 F | SYSTOLIC BLOOD PRESSURE: 145 MMHG

## 2024-08-30 DIAGNOSIS — B37.2 CANDIDIASIS, INTERTRIGO: Primary | ICD-10-CM

## 2024-08-30 PROCEDURE — 99213 OFFICE O/P EST LOW 20 MIN: CPT

## 2024-08-30 RX ORDER — FLUCONAZOLE 150 MG/1
150 TABLET ORAL
Qty: 2 TABLET | Refills: 0 | Status: SHIPPED | OUTPATIENT
Start: 2024-08-30 | End: 2024-09-05

## 2024-08-30 NOTE — DISCHARGE INSTRUCTIONS
Your rash/pain and itchiness is most likely a Candida infection.  This is a fungal infection that thrives in moist environments.  If you do not keep this area dry and will never heal regardless of any medication you take.    You can try to keep this area dry by using adult briefs.  These worked by helping pull fluid/liquid away from the skin.  Additional measures include changing her briefs frequently every time you have an accident and wearing dry close that are moist or working.    If you are having uncontrolled pain fever/chills or any other urgent/emergent medical concerns please go to ER.  If symptoms fail to improve please go see your family doctor.    Please continue to use the topical antifungal powders you are provided previously.  This is okay to use in addition to the fluconazole tablets I ordered you today.    I hope you are feeling better soon!

## 2024-08-30 NOTE — ED PROVIDER NOTES
[facility] URGENT CARE      URGENT CARE     Pt Name: Trice Merritt  MRN: [id number]  Birthdate 1980  Date of evaluation: 2024  Provider: YANNICK Maria CNP    Urgent Care Encounter     CHIEF COMPLAINT       Chief Complaint   Patient presents with    Rash     HISTORY OF PRESENT ILLNESS   Trice Merritt is a 44 y.o. female who presents to urgent care with chief complaint of painful rash to inguinal folds.  Started 2 months ago, evaluated by other provider who provided topical antifungals including cream and powder.  Unfortunately rash is continuing to worsen.  Denies fevers.  Denies nausea or vomiting.    History obtained from patient and patient's family member  URGENT CARE TIMELINE      ED Course as of 08/30/24 1921   Fri Aug 30, 2024   1903 BP(!): 145/82  Vitals are stable.  Afebrile. [JR]      ED Course User Index  [JR] Paul Zapien APRN - CNP     PAST MEDICAL HISTORY         Diagnosis Date    Bipolar 1 disorder (HCC)     Cerebral artery occlusion with cerebral infarction (HCC)     COPD (chronic obstructive pulmonary disease) (HCC)     Diabetes mellitus (HCC)     Drug addiction (HCC)     HPV (human papilloma virus) infection     Hypertension     PTSD (post-traumatic stress disorder)      SURGICALHISTORY     Patient  has a past surgical history that includes Foot surgery (); Dilation and curettage of uterus (); Cervix biopsy (); Axillary Surgery (left , rt );  section (); and other surgical history (2012).  CURRENT MEDICATIONS       Discharge Medication List as of 2024  7:18 PM        CONTINUE these medications which have NOT CHANGED    Details   aspirin 81 MG EC tablet Take 1 tablet by mouth dailyHistorical Med      atorvastatin (LIPITOR) 80 MG tablet Take 1 tablet by mouth dailyHistorical Med      citalopram (CELEXA) 40 MG tablet Take 1 tablet by mouth dailyHistorical Med      !! cloNIDine (CATAPRES) 0.1 MG tablet      START taking these medications    Details   fluconazole (DIFLUCAN) 150 MG tablet Take 1 tablet by mouth every 72 hours for 6 days, Disp-2 tablet, R-0Normal           Discharge Medication List as of 8/30/2024  7:18 PM        Discharge Medication List as of 8/30/2024  7:18 PM        YANNICK Maria CNP    (Please note that portions of this note were completed with a voice recognition program. Efforts were made to edit the dictations but occasionally words are mis-transcribed.)            Paul Zapien, APRN - CNP  08/30/24 1921

## 2024-08-30 NOTE — ED TRIAGE NOTES
Pt to uc with c/o a rash in her groin x 2 months. Pt has been treated before but reports it has not gotten better.